# Patient Record
Sex: FEMALE | Race: BLACK OR AFRICAN AMERICAN | NOT HISPANIC OR LATINO | Employment: FULL TIME | URBAN - METROPOLITAN AREA
[De-identification: names, ages, dates, MRNs, and addresses within clinical notes are randomized per-mention and may not be internally consistent; named-entity substitution may affect disease eponyms.]

---

## 2023-01-26 PROBLEM — B37.31 CANDIDAL VAGINITIS: Status: ACTIVE | Noted: 2023-01-26

## 2023-01-26 PROBLEM — G43.909 MIGRAINE: Status: ACTIVE | Noted: 2023-01-26

## 2023-01-26 PROBLEM — D64.9 ANEMIA: Status: ACTIVE | Noted: 2023-01-26

## 2023-01-26 PROBLEM — E87.6 HYPOKALEMIA: Status: ACTIVE | Noted: 2023-01-26

## 2023-01-26 PROBLEM — E55.9 VITAMIN D DEFICIENCY: Status: ACTIVE | Noted: 2023-01-26

## 2023-01-26 PROBLEM — M54.2 NECK PAIN: Status: ACTIVE | Noted: 2023-01-26

## 2023-01-26 PROBLEM — K91.2 POSTOPERATIVE MALABSORPTION (HHS-HCC): Status: ACTIVE | Noted: 2023-01-26

## 2023-01-26 PROBLEM — M25.561 RIGHT KNEE PAIN: Status: ACTIVE | Noted: 2023-01-26

## 2023-01-26 PROBLEM — I10 HTN (HYPERTENSION): Status: ACTIVE | Noted: 2023-01-26

## 2023-01-26 PROBLEM — R63.2 POLYPHAGIA: Status: ACTIVE | Noted: 2023-01-26

## 2023-01-26 PROBLEM — G47.33 OBSTRUCTIVE SLEEP APNEA, ADULT: Status: ACTIVE | Noted: 2023-01-26

## 2023-01-26 PROBLEM — L29.9 ITCHING: Status: ACTIVE | Noted: 2023-01-26

## 2023-01-26 PROBLEM — S29.019A STRAIN OF THORACIC REGION: Status: ACTIVE | Noted: 2023-01-26

## 2023-01-26 PROBLEM — R20.0 LEFT SIDED NUMBNESS: Status: ACTIVE | Noted: 2023-01-26

## 2023-01-26 PROBLEM — Z98.84 BARIATRIC SURGERY STATUS: Status: ACTIVE | Noted: 2023-01-26

## 2023-01-26 RX ORDER — CHOLECALCIFEROL (VITAMIN D3) 125 MCG
1 CAPSULE ORAL DAILY
COMMUNITY
End: 2023-03-10 | Stop reason: SDUPTHER

## 2023-01-26 RX ORDER — PROPRANOLOL HYDROCHLORIDE 40 MG/1
1 TABLET ORAL 2 TIMES DAILY
COMMUNITY
Start: 2017-03-22 | End: 2023-03-10 | Stop reason: SDUPTHER

## 2023-01-26 RX ORDER — FOLIC ACID/MULTIVIT,IRON,MINER 0.4MG-18MG
1 TABLET ORAL DAILY
COMMUNITY
End: 2023-02-03

## 2023-01-26 RX ORDER — AMLODIPINE BESYLATE 10 MG/1
1 TABLET ORAL DAILY
COMMUNITY
Start: 2017-01-23 | End: 2023-03-10 | Stop reason: SDUPTHER

## 2023-01-26 RX ORDER — ONDANSETRON 4 MG/1
1-2 TABLET, ORALLY DISINTEGRATING ORAL
COMMUNITY
Start: 2022-09-08 | End: 2023-06-28 | Stop reason: ALTCHOICE

## 2023-01-26 RX ORDER — POTASSIUM CHLORIDE 750 MG/1
1 TABLET, EXTENDED RELEASE ORAL DAILY
COMMUNITY
Start: 2017-02-04 | End: 2023-03-10 | Stop reason: SDUPTHER

## 2023-01-26 RX ORDER — OMEPRAZOLE 40 MG/1
1 CAPSULE, DELAYED RELEASE ORAL DAILY
COMMUNITY
Start: 2022-09-08 | End: 2023-06-28 | Stop reason: ALTCHOICE

## 2023-01-26 RX ORDER — LISINOPRIL AND HYDROCHLOROTHIAZIDE 10; 12.5 MG/1; MG/1
1 TABLET ORAL DAILY
COMMUNITY
Start: 2022-10-05 | End: 2023-03-10 | Stop reason: SDUPTHER

## 2023-02-03 RX ORDER — FERROUS SULFATE 15 MG/ML
4 DROPS ORAL DAILY
COMMUNITY
End: 2023-06-28 | Stop reason: ALTCHOICE

## 2023-03-10 ENCOUNTER — OFFICE VISIT (OUTPATIENT)
Dept: PRIMARY CARE | Facility: CLINIC | Age: 38
End: 2023-03-10
Payer: COMMERCIAL

## 2023-03-10 VITALS
DIASTOLIC BLOOD PRESSURE: 80 MMHG | TEMPERATURE: 95 F | WEIGHT: 217 LBS | SYSTOLIC BLOOD PRESSURE: 110 MMHG | BODY MASS INDEX: 32.99 KG/M2

## 2023-03-10 DIAGNOSIS — I10 HYPERTENSION, UNSPECIFIED TYPE: Primary | ICD-10-CM

## 2023-03-10 PROCEDURE — 3079F DIAST BP 80-89 MM HG: CPT | Performed by: INTERNAL MEDICINE

## 2023-03-10 PROCEDURE — 3008F BODY MASS INDEX DOCD: CPT | Performed by: INTERNAL MEDICINE

## 2023-03-10 PROCEDURE — 99213 OFFICE O/P EST LOW 20 MIN: CPT | Performed by: INTERNAL MEDICINE

## 2023-03-10 PROCEDURE — 3074F SYST BP LT 130 MM HG: CPT | Performed by: INTERNAL MEDICINE

## 2023-03-10 RX ORDER — PROPRANOLOL HYDROCHLORIDE 40 MG/1
40 TABLET ORAL 2 TIMES DAILY
Qty: 60 TABLET | Refills: 3 | Status: SHIPPED | OUTPATIENT
Start: 2023-03-10 | End: 2023-06-28 | Stop reason: SDUPTHER

## 2023-03-10 RX ORDER — POTASSIUM CHLORIDE 750 MG/1
10 TABLET, FILM COATED, EXTENDED RELEASE ORAL DAILY
Qty: 30 TABLET | Refills: 3 | Status: SHIPPED | OUTPATIENT
Start: 2023-03-10 | End: 2023-03-21 | Stop reason: ALTCHOICE

## 2023-03-10 RX ORDER — CHOLECALCIFEROL (VITAMIN D3) 125 MCG
125 CAPSULE ORAL DAILY
Qty: 30 CAPSULE | Refills: 3 | Status: SHIPPED | OUTPATIENT
Start: 2023-03-10 | End: 2023-04-09

## 2023-03-10 RX ORDER — IBUPROFEN 200 MG
1 CAPSULE ORAL 3 TIMES DAILY
COMMUNITY

## 2023-03-10 RX ORDER — LISINOPRIL AND HYDROCHLOROTHIAZIDE 10; 12.5 MG/1; MG/1
1 TABLET ORAL DAILY
Qty: 30 TABLET | Refills: 3 | Status: SHIPPED | OUTPATIENT
Start: 2023-03-10 | End: 2023-06-28 | Stop reason: ALTCHOICE

## 2023-03-10 RX ORDER — AMLODIPINE BESYLATE 10 MG/1
10 TABLET ORAL DAILY
Qty: 30 TABLET | Refills: 3 | Status: SHIPPED | OUTPATIENT
Start: 2023-03-10 | End: 2023-06-28 | Stop reason: SDUPTHER

## 2023-03-10 ASSESSMENT — PATIENT HEALTH QUESTIONNAIRE - PHQ9
SUM OF ALL RESPONSES TO PHQ9 QUESTIONS 1 AND 2: 0
1. LITTLE INTEREST OR PLEASURE IN DOING THINGS: NOT AT ALL
2. FEELING DOWN, DEPRESSED OR HOPELESS: NOT AT ALL

## 2023-03-10 NOTE — PROGRESS NOTES
I reviewed with the resident the medical history and the resident’s findings on physical examination.  I discussed with the resident the patient’s diagnosis and concur with the treatment plan as documented in the resident note.     I saw and evaluated the patient. I personally obtained the key and critical portions of the history and physical exam or was physically present for key and critical portions performed by the trainee. I reviewed the trainee's documentation and discussed the patient with the trainee. I agree with the trainee's medical decision making, as documented on the trainee's note.       Jerilyn Pal MD

## 2023-03-10 NOTE — PROGRESS NOTES
MRN: [id number]     Subjective   Patient ID: Cindy Preciado is a 38 y.o. female who presents for Anemia, Hypertension, Obesity, and Sleep Apnea.  HPI  Ms Cindy Preciado is a 37 yo female patient with PMHx of HTN, morbid obesity s/p bariatric surgery, migraine, MARIA LUISA and vitamin D deficiency who presented to the office for follow up.   She has been doing well, eating regular diet and tolerating it well, has normal bowel movements, taking all her supplement and following with general surgery, she lost almost 50 lbs since the surgery. Her BP is stable around 120/80 at home, not feeling any dizziness, taking all her medications on time. She saw her OBGYN last month who ordered for her the Mammogram and she is planning to call and get it scheduled.    Review of Systems    - CONSTITUTIONAL: Denies fever and chills, loosing weight as expected after her surgery  - HEENT: Denies changes in vision and hearing.  - RESPIRATORY: Denies SOB and cough.  - CV: Denies palpitations and CP.   - GI: Denies abdominal pain, nausea, vomiting and diarrhea.  - : Denies dysuria and urinary frequency.  - MSK: Denies myalgia and  joint pain.  - SKIN: Denies rash and pruritus.  - NEUROLOGICAL: Denies headache and syncope.  - PSYCHIATRIC: Denies recent changes in mood. Denies anxiety and depression.     Objective   Physical Exam    Constitutional: patient is alert and cooperative with exam  skin: dry and warm  Eyes: EOMI and clear sclera  ENMT: moist mucous membranes  Head/Neck: normal neck, no JVD and trachea midline  Respiratory/Thorax: Clear to auscultation bilaterally, no wheezing or crackles appreciated  Cardiovascular: regular rate and rhythm, S1 and S2 present, no murmurs heard  Gastrointestinal: bowel sounds present, no pain or tenderness upon palpation, soft and nondistended  Extremities: +2 radial, posterior tibial, and pedal pulse, no lower extremity edema noted  Neurological: A&Ox3 no focal deficit     Visit Vitals  /80   Temp  35 °C (95 °F)          Assessment/Plan       #HTN  - BP in office 120/80  - Asked patient to keep a BP diary   - Will continue same regimen: Amlodipine 10 mg OD + Lisinopril-HCTZ 10-12.5 OD + propranolol 40 mg BID    #Obesity s/p bariatric surgery  - Lost 22 lbs since last visit in November (more than 50 lbs since surgery)  - On regular diet   - Having normal BM  - Continue Multivitamin OD    #MARIA LUISA  - Patient continues to wear her CPAP  - Advised patient to follow up with Pulmonology in the next 3 months to see if she would benefit from repeating her sleep study after loosing weight     #Vitamin D Deficiency      - Vit D 48 in 12/2022  - Continue Vitamin D 5000 OD  - Will consider deecreasing the dose at next visit    #Anemia/ Improving   - Hb 12 on CBC in 12/2022  - Continue Iron liquid 325 mg daily    Health maintenance  - Up to date on blood work  - up to date on PAP screening,  follows regularly with OBGYN  - Mammogram ordered by OBGYN planning to call for scheduling  - Received Flu shot this fall and believe that she received dTap during the last 10 years    Dispo: RTC in 3 months

## 2023-03-20 LAB
ALANINE AMINOTRANSFERASE (SGPT) (U/L) IN SER/PLAS: 14 U/L (ref 7–45)
ALBUMIN (G/DL) IN SER/PLAS: 4.1 G/DL (ref 3.4–5)
ALKALINE PHOSPHATASE (U/L) IN SER/PLAS: 48 U/L (ref 33–110)
ANION GAP IN SER/PLAS: 10 MMOL/L (ref 10–20)
ASPARTATE AMINOTRANSFERASE (SGOT) (U/L) IN SER/PLAS: 23 U/L (ref 9–39)
BASOPHILS (10*3/UL) IN BLOOD BY AUTOMATED COUNT: 0.04 X10E9/L (ref 0–0.1)
BASOPHILS/100 LEUKOCYTES IN BLOOD BY AUTOMATED COUNT: 0.7 % (ref 0–2)
BILIRUBIN TOTAL (MG/DL) IN SER/PLAS: 0.7 MG/DL (ref 0–1.2)
CALCIDIOL (25 OH VITAMIN D3) (NG/ML) IN SER/PLAS: 39 NG/ML
CALCIUM (MG/DL) IN SER/PLAS: 9.2 MG/DL (ref 8.6–10.3)
CARBON DIOXIDE, TOTAL (MMOL/L) IN SER/PLAS: 31 MMOL/L (ref 21–32)
CHLORIDE (MMOL/L) IN SER/PLAS: 100 MMOL/L (ref 98–107)
COBALAMIN (VITAMIN B12) (PG/ML) IN SER/PLAS: 936 PG/ML (ref 211–911)
CREATININE (MG/DL) IN SER/PLAS: 0.56 MG/DL (ref 0.5–1.05)
EOSINOPHILS (10*3/UL) IN BLOOD BY AUTOMATED COUNT: 0.05 X10E9/L (ref 0–0.7)
EOSINOPHILS/100 LEUKOCYTES IN BLOOD BY AUTOMATED COUNT: 0.9 % (ref 0–6)
ERYTHROCYTE DISTRIBUTION WIDTH (RATIO) BY AUTOMATED COUNT: 12.5 % (ref 11.5–14.5)
ERYTHROCYTE MEAN CORPUSCULAR HEMOGLOBIN CONCENTRATION (G/DL) BY AUTOMATED: 31.1 G/DL (ref 32–36)
ERYTHROCYTE MEAN CORPUSCULAR VOLUME (FL) BY AUTOMATED COUNT: 97 FL (ref 80–100)
ERYTHROCYTES (10*6/UL) IN BLOOD BY AUTOMATED COUNT: 4.09 X10E12/L (ref 4–5.2)
ESTIMATED AVERAGE GLUCOSE FOR HBA1C: 77 MG/DL
FERRITIN (UG/LL) IN SER/PLAS: 125 UG/L (ref 8–150)
FOLATE (NG/ML) IN SER/PLAS: 18.7 NG/ML
GFR FEMALE: >90 ML/MIN/1.73M2
GLUCOSE (MG/DL) IN SER/PLAS: 75 MG/DL (ref 74–99)
HEMATOCRIT (%) IN BLOOD BY AUTOMATED COUNT: 39.6 % (ref 36–46)
HEMOGLOBIN (G/DL) IN BLOOD: 12.3 G/DL (ref 12–16)
HEMOGLOBIN A1C/HEMOGLOBIN TOTAL IN BLOOD: 4.3 %
IMMATURE GRANULOCYTES/100 LEUKOCYTES IN BLOOD BY AUTOMATED COUNT: 0.4 % (ref 0–0.9)
IRON (UG/DL) IN SER/PLAS: 96 UG/DL (ref 35–150)
IRON BINDING CAPACITY (UG/DL) IN SER/PLAS: 310 UG/DL (ref 240–445)
IRON SATURATION (%) IN SER/PLAS: 31 % (ref 25–45)
LEUKOCYTES (10*3/UL) IN BLOOD BY AUTOMATED COUNT: 5.5 X10E9/L (ref 4.4–11.3)
LYMPHOCYTES (10*3/UL) IN BLOOD BY AUTOMATED COUNT: 1.88 X10E9/L (ref 1.2–4.8)
LYMPHOCYTES/100 LEUKOCYTES IN BLOOD BY AUTOMATED COUNT: 34.2 % (ref 13–44)
MONOCYTES (10*3/UL) IN BLOOD BY AUTOMATED COUNT: 0.28 X10E9/L (ref 0.1–1)
MONOCYTES/100 LEUKOCYTES IN BLOOD BY AUTOMATED COUNT: 5.1 % (ref 2–10)
NEUTROPHILS (10*3/UL) IN BLOOD BY AUTOMATED COUNT: 3.23 X10E9/L (ref 1.2–7.7)
NEUTROPHILS/100 LEUKOCYTES IN BLOOD BY AUTOMATED COUNT: 58.7 % (ref 40–80)
PARATHYRIN INTACT (PG/ML) IN SER/PLAS: 32 PG/ML (ref 18.5–88)
PLATELETS (10*3/UL) IN BLOOD AUTOMATED COUNT: 324 X10E9/L (ref 150–450)
POTASSIUM (MMOL/L) IN SER/PLAS: 2.9 MMOL/L (ref 3.5–5.3)
PROTEIN TOTAL: 7.5 G/DL (ref 6.4–8.2)
SODIUM (MMOL/L) IN SER/PLAS: 138 MMOL/L (ref 136–145)
UREA NITROGEN (MG/DL) IN SER/PLAS: 13 MG/DL (ref 6–23)

## 2023-03-21 ENCOUNTER — TELEMEDICINE (OUTPATIENT)
Dept: PRIMARY CARE | Facility: CLINIC | Age: 38
End: 2023-03-21
Payer: COMMERCIAL

## 2023-03-21 DIAGNOSIS — E87.6 HYPOKALEMIA: Primary | ICD-10-CM

## 2023-03-21 DIAGNOSIS — I10 HYPERTENSION, UNSPECIFIED TYPE: ICD-10-CM

## 2023-03-21 PROCEDURE — 99214 OFFICE O/P EST MOD 30 MIN: CPT | Performed by: INTERNAL MEDICINE

## 2023-03-21 RX ORDER — POTASSIUM CHLORIDE 1500 MG/1
40 TABLET, EXTENDED RELEASE ORAL DAILY
Qty: 60 TABLET | Refills: 0 | Status: SHIPPED | OUTPATIENT
Start: 2023-03-21 | End: 2023-05-04 | Stop reason: DRUGHIGH

## 2023-03-21 RX ORDER — LISINOPRIL 10 MG/1
10 TABLET ORAL DAILY
Qty: 30 TABLET | Refills: 2 | Status: SHIPPED | OUTPATIENT
Start: 2023-03-21 | End: 2023-06-28 | Stop reason: SDUPTHER

## 2023-03-21 NOTE — PROGRESS NOTES
Subjective   Patient ID: Cindy Preciado is a 38 y.o. female who presents for No chief complaint on file..    HPI     Review of Systems    Objective   There were no vitals taken for this visit.    Physical Exam    Assessment/Plan   Problem List Items Addressed This Visit          Circulatory    HTN (hypertension)    Relevant Medications    lisinopril 10 mg tablet       Other    Hypokalemia - Primary    Relevant Medications    potassium chloride CR (K-Tab) 20 mEq ER tablet    Other Relevant Orders    Basic metabolic panel   An interactive audio and video telecommunication system which permits real time communications between the patient (at the originating site) and provider (at the distant site) was utilized to provide this telehealth service.    Verbal consent was requested and obtained from the patient on the day of encounter.  Had BW done, which was ordered by the surgeon.  It came back today and she was told to call us.  Her K level is at 2.9.  She is already taking K 10 meq daily.  She was told she may need K infusion.  We will start her on 40 meq daily for 3 days.  We will stop Lisinopril/hydrochlorothiazide and continue with Lisinopril only.  Recheck BMP in 3 days

## 2023-03-24 ENCOUNTER — LAB (OUTPATIENT)
Dept: LAB | Facility: LAB | Age: 38
End: 2023-03-24
Payer: COMMERCIAL

## 2023-03-24 DIAGNOSIS — E87.6 HYPOKALEMIA: ICD-10-CM

## 2023-03-24 LAB
ANION GAP IN SER/PLAS: 8 MMOL/L (ref 10–20)
CALCIUM (MG/DL) IN SER/PLAS: 8.8 MG/DL (ref 8.6–10.3)
CARBON DIOXIDE, TOTAL (MMOL/L) IN SER/PLAS: 30 MMOL/L (ref 21–32)
CHLORIDE (MMOL/L) IN SER/PLAS: 104 MMOL/L (ref 98–107)
CREATININE (MG/DL) IN SER/PLAS: 0.62 MG/DL (ref 0.5–1.05)
GFR FEMALE: >90 ML/MIN/1.73M2
GLUCOSE (MG/DL) IN SER/PLAS: 84 MG/DL (ref 74–99)
POTASSIUM (MMOL/L) IN SER/PLAS: 3.9 MMOL/L (ref 3.5–5.3)
SODIUM (MMOL/L) IN SER/PLAS: 138 MMOL/L (ref 136–145)
UREA NITROGEN (MG/DL) IN SER/PLAS: 14 MG/DL (ref 6–23)

## 2023-03-24 PROCEDURE — 36415 COLL VENOUS BLD VENIPUNCTURE: CPT

## 2023-03-24 PROCEDURE — 80048 BASIC METABOLIC PNL TOTAL CA: CPT

## 2023-03-27 LAB — VITAMIN B1, WHOLE BLOOD: 125 NMOL/L (ref 70–180)

## 2023-04-26 DIAGNOSIS — E87.6 HYPOKALEMIA: ICD-10-CM

## 2023-04-26 RX ORDER — POTASSIUM CHLORIDE 750 MG/1
10 TABLET, FILM COATED, EXTENDED RELEASE ORAL DAILY
Qty: 30 TABLET | Refills: 2 | Status: SHIPPED | OUTPATIENT
Start: 2023-04-26 | End: 2023-05-04 | Stop reason: DRUGHIGH

## 2023-05-04 DIAGNOSIS — E87.6 HYPOKALEMIA: ICD-10-CM

## 2023-05-04 RX ORDER — POTASSIUM CHLORIDE 750 MG/1
10 TABLET, FILM COATED, EXTENDED RELEASE ORAL DAILY
Qty: 90 TABLET | Refills: 0 | Status: SHIPPED | OUTPATIENT
Start: 2023-05-04 | End: 2023-05-09 | Stop reason: SDUPTHER

## 2023-05-09 DIAGNOSIS — E87.6 HYPOKALEMIA: ICD-10-CM

## 2023-05-09 RX ORDER — POTASSIUM CHLORIDE 750 MG/1
10 TABLET, FILM COATED, EXTENDED RELEASE ORAL DAILY
Qty: 90 TABLET | Refills: 0 | Status: SHIPPED | OUTPATIENT
Start: 2023-05-09 | End: 2023-06-28 | Stop reason: SDUPTHER

## 2023-06-28 ENCOUNTER — OFFICE VISIT (OUTPATIENT)
Dept: PRIMARY CARE | Facility: CLINIC | Age: 38
End: 2023-06-28
Payer: COMMERCIAL

## 2023-06-28 ENCOUNTER — LAB (OUTPATIENT)
Dept: LAB | Facility: LAB | Age: 38
End: 2023-06-28
Payer: COMMERCIAL

## 2023-06-28 VITALS
WEIGHT: 204 LBS | SYSTOLIC BLOOD PRESSURE: 122 MMHG | DIASTOLIC BLOOD PRESSURE: 80 MMHG | HEIGHT: 68 IN | BODY MASS INDEX: 30.92 KG/M2

## 2023-06-28 DIAGNOSIS — E87.6 HYPOKALEMIA: ICD-10-CM

## 2023-06-28 DIAGNOSIS — I10 HYPERTENSION, UNSPECIFIED TYPE: ICD-10-CM

## 2023-06-28 DIAGNOSIS — G43.909 MIGRAINE WITHOUT STATUS MIGRAINOSUS, NOT INTRACTABLE, UNSPECIFIED MIGRAINE TYPE: Primary | ICD-10-CM

## 2023-06-28 LAB
ANION GAP IN SER/PLAS: 12 MMOL/L (ref 10–20)
CALCIUM (MG/DL) IN SER/PLAS: 9.8 MG/DL (ref 8.6–10.6)
CARBON DIOXIDE, TOTAL (MMOL/L) IN SER/PLAS: 26 MMOL/L (ref 21–32)
CHLORIDE (MMOL/L) IN SER/PLAS: 106 MMOL/L (ref 98–107)
CREATININE (MG/DL) IN SER/PLAS: 0.75 MG/DL (ref 0.5–1.05)
GFR FEMALE: >90 ML/MIN/1.73M2
GLUCOSE (MG/DL) IN SER/PLAS: 85 MG/DL (ref 74–99)
POTASSIUM (MMOL/L) IN SER/PLAS: 4.5 MMOL/L (ref 3.5–5.3)
SODIUM (MMOL/L) IN SER/PLAS: 139 MMOL/L (ref 136–145)
UREA NITROGEN (MG/DL) IN SER/PLAS: 18 MG/DL (ref 6–23)

## 2023-06-28 PROCEDURE — 80048 BASIC METABOLIC PNL TOTAL CA: CPT

## 2023-06-28 PROCEDURE — 99214 OFFICE O/P EST MOD 30 MIN: CPT | Performed by: INTERNAL MEDICINE

## 2023-06-28 PROCEDURE — 3008F BODY MASS INDEX DOCD: CPT | Performed by: INTERNAL MEDICINE

## 2023-06-28 PROCEDURE — 36415 COLL VENOUS BLD VENIPUNCTURE: CPT

## 2023-06-28 PROCEDURE — 3079F DIAST BP 80-89 MM HG: CPT | Performed by: INTERNAL MEDICINE

## 2023-06-28 PROCEDURE — 3074F SYST BP LT 130 MM HG: CPT | Performed by: INTERNAL MEDICINE

## 2023-06-28 RX ORDER — LISINOPRIL 10 MG/1
10 TABLET ORAL DAILY
Qty: 90 TABLET | Refills: 1 | Status: SHIPPED | OUTPATIENT
Start: 2023-06-28 | End: 2024-03-04 | Stop reason: SDUPTHER

## 2023-06-28 RX ORDER — POTASSIUM CHLORIDE 750 MG/1
10 TABLET, FILM COATED, EXTENDED RELEASE ORAL DAILY
Qty: 90 TABLET | Refills: 0 | Status: SHIPPED | OUTPATIENT
Start: 2023-06-28

## 2023-06-28 RX ORDER — PROPRANOLOL HYDROCHLORIDE 40 MG/1
40 TABLET ORAL 2 TIMES DAILY
Qty: 180 TABLET | Refills: 1 | Status: SHIPPED | OUTPATIENT
Start: 2023-06-28 | End: 2024-03-04 | Stop reason: SDUPTHER

## 2023-06-28 RX ORDER — AMLODIPINE BESYLATE 10 MG/1
10 TABLET ORAL DAILY
Qty: 90 TABLET | Refills: 1 | Status: SHIPPED | OUTPATIENT
Start: 2023-06-28 | End: 2024-03-12 | Stop reason: SDUPTHER

## 2023-06-28 ASSESSMENT — ENCOUNTER SYMPTOMS
RESPIRATORY NEGATIVE: 1
CARDIOVASCULAR NEGATIVE: 1
CONSTITUTIONAL NEGATIVE: 1
GASTROINTESTINAL NEGATIVE: 1

## 2023-06-28 NOTE — PROGRESS NOTES
Subjective   Patient ID: Cindy Preciado is a 38 y.o. female who presents for Follow-up.  HPI    Review of Systems   Constitutional: Negative.    Respiratory: Negative.     Cardiovascular: Negative.    Gastrointestinal: Negative.        Objective   Physical Exam  Constitutional:       Appearance: She is well-developed.   Cardiovascular:      Rate and Rhythm: Normal rate and regular rhythm.      Heart sounds: Normal heart sounds. No murmur heard.  Pulmonary:      Effort: Pulmonary effort is normal.      Breath sounds: Normal breath sounds.   Abdominal:      General: Bowel sounds are normal.      Palpations: Abdomen is soft.         Assessment/Plan   Problem List Items Addressed This Visit       HTN (hypertension)    Relevant Medications    amLODIPine (Norvasc) 10 mg tablet    lisinopril 10 mg tablet    propranolol (Inderal) 40 mg tablet    Hypokalemia    Relevant Medications    potassium chloride CR (Klor-Con) 10 mEq ER tablet    Other Relevant Orders    Basic metabolic panel    Migraine - Primary        HTN.  Well controlled.  Continue with current medications.  Check BP at home daily.  Report to us if the numbers are higher than 130/80.    Hypokalemia  Lisinopril stopped 3 months ago  Rechecking K now  May not need K supplements anymore    Migraines  Well controlled with Propranolol    Due for a physical       Jerilyn Pal MD

## 2023-06-29 ENCOUNTER — TELEPHONE (OUTPATIENT)
Dept: PRIMARY CARE | Facility: CLINIC | Age: 38
End: 2023-06-29
Payer: COMMERCIAL

## 2023-08-25 ENCOUNTER — OFFICE VISIT (OUTPATIENT)
Dept: PRIMARY CARE | Facility: CLINIC | Age: 38
End: 2023-08-25
Payer: COMMERCIAL

## 2023-08-25 VITALS — WEIGHT: 192 LBS | DIASTOLIC BLOOD PRESSURE: 82 MMHG | SYSTOLIC BLOOD PRESSURE: 120 MMHG | BODY MASS INDEX: 29.19 KG/M2

## 2023-08-25 DIAGNOSIS — Z00.00 PHYSICAL EXAM, ANNUAL: Primary | ICD-10-CM

## 2023-08-25 DIAGNOSIS — I10 HYPERTENSION, UNSPECIFIED TYPE: ICD-10-CM

## 2023-08-25 PROCEDURE — 3074F SYST BP LT 130 MM HG: CPT | Performed by: INTERNAL MEDICINE

## 2023-08-25 PROCEDURE — 99396 PREV VISIT EST AGE 40-64: CPT | Performed by: INTERNAL MEDICINE

## 2023-08-25 PROCEDURE — 3079F DIAST BP 80-89 MM HG: CPT | Performed by: INTERNAL MEDICINE

## 2023-08-25 PROCEDURE — 3008F BODY MASS INDEX DOCD: CPT | Performed by: INTERNAL MEDICINE

## 2023-08-25 ASSESSMENT — PATIENT HEALTH QUESTIONNAIRE - PHQ9
SUM OF ALL RESPONSES TO PHQ9 QUESTIONS 1 AND 2: 0
2. FEELING DOWN, DEPRESSED OR HOPELESS: NOT AT ALL
1. LITTLE INTEREST OR PLEASURE IN DOING THINGS: NOT AT ALL

## 2023-08-25 NOTE — PROGRESS NOTES
Subjective   Patient ID: Cidny Preciado is a 38 y.o. female who presents for Annual Exam.  Patient here for CPE. Doing well overall. Has lost over 100lbs since bariatric surgery. Denies chest pain, LOPEZ, abd pain, n/v/d/c, changes in urination.   Mood is stable and sleep is regular.   Complaint with meds at home.         Review of Systems   All other systems reviewed and are negative.      Objective   Physical Exam  Vitals reviewed.   Constitutional:       Appearance: Normal appearance.   Cardiovascular:      Rate and Rhythm: Normal rate and regular rhythm.      Pulses: Normal pulses.      Heart sounds: Normal heart sounds.   Pulmonary:      Effort: Pulmonary effort is normal.      Breath sounds: Normal breath sounds.   Abdominal:      General: Abdomen is flat. Bowel sounds are normal.      Palpations: Abdomen is soft.   Neurological:      Mental Status: She is alert.       /82   Wt 87.1 kg (192 lb)   BMI 29.19 kg/m²      Assessment/Plan   Problem List Items Addressed This Visit       HTN (hypertension),well controlled   -labs UTD   -BP stable  -continue current meds      Other Visit Diagnoses       Physical exam, annual    -  Primary  -labs UTD   -immunizations UTD, will get flu shot at later time   -mammo UTD. Will make appt with OB for pap

## 2023-08-25 NOTE — PROGRESS NOTES
I reviewed with the resident the medical history and the resident’s findings on physical examination.  I discussed with the resident the patient’s diagnosis and concur with the treatment plan as documented in the resident note.     I saw and evaluated the patient. I personally obtained the key and critical portions of the history and physical exam or was physically present for key and critical portions performed by the trainee. I reviewed the trainee's documentation and discussed the patient with the trainee. I agree with the trainee's medical decision making, as documented on the trainee's note.     Normal exam  BW good  Continue with current      Jerilyn Pal MD

## 2023-09-30 ENCOUNTER — LAB (OUTPATIENT)
Dept: LAB | Facility: LAB | Age: 38
End: 2023-09-30
Payer: COMMERCIAL

## 2023-09-30 DIAGNOSIS — Z98.84 BARIATRIC SURGERY STATUS: ICD-10-CM

## 2023-09-30 DIAGNOSIS — K91.2 POSTSURGICAL MALABSORPTION, NOT ELSEWHERE CLASSIFIED (HHS-HCC): Primary | ICD-10-CM

## 2023-09-30 LAB
ALBUMIN SERPL BCP-MCNC: 4.1 G/DL (ref 3.4–5)
ALP SERPL-CCNC: 45 U/L (ref 33–110)
ALT SERPL W P-5'-P-CCNC: 10 U/L (ref 7–45)
ANION GAP SERPL CALC-SCNC: 11 MMOL/L (ref 10–20)
AST SERPL W P-5'-P-CCNC: 14 U/L (ref 9–39)
BASOPHILS # BLD AUTO: 0.03 X10*3/UL (ref 0–0.1)
BASOPHILS NFR BLD AUTO: 0.6 %
BILIRUB SERPL-MCNC: 0.7 MG/DL (ref 0–1.2)
BUN SERPL-MCNC: 11 MG/DL (ref 6–23)
CALCIUM SERPL-MCNC: 9.1 MG/DL (ref 8.6–10.3)
CHLORIDE SERPL-SCNC: 105 MMOL/L (ref 98–107)
CHOLEST SERPL-MCNC: 209 MG/DL (ref 0–199)
CHOLESTEROL/HDL RATIO: 3.4
CO2 SERPL-SCNC: 27 MMOL/L (ref 21–32)
CREAT SERPL-MCNC: 0.58 MG/DL (ref 0.5–1.05)
EOSINOPHIL # BLD AUTO: 0.09 X10*3/UL (ref 0–0.7)
EOSINOPHIL NFR BLD AUTO: 1.8 %
FERRITIN SERPL-MCNC: 135 NG/ML (ref 8–150)
GFR SERPL CREATININE-BSD FRML MDRD: >90 ML/MIN/1.73M*2
GLUCOSE SERPL-MCNC: 86 MG/DL (ref 74–99)
HDLC SERPL-MCNC: 62.3 MG/DL
IMM GRANULOCYTES # BLD AUTO: 0.01 X10*3/UL (ref 0–0.7)
IMM GRANULOCYTES NFR BLD AUTO: 0.2 % (ref 0–0.9)
IRON SATN MFR SERPL: 27 % (ref 25–45)
IRON SERPL-MCNC: 81 UG/DL (ref 35–150)
LDLC SERPL CALC-MCNC: 138 MG/DL (ref 140–190)
LYMPHOCYTES # BLD AUTO: 1.94 X10*3/UL (ref 1.2–4.8)
LYMPHOCYTES NFR BLD AUTO: 38.3 %
MONOCYTES # BLD AUTO: 0.33 X10*3/UL (ref 0.1–1)
MONOCYTES NFR BLD AUTO: 6.5 %
NEUTROPHILS # BLD AUTO: 2.66 X10*3/UL (ref 1.2–7.7)
NEUTROPHILS NFR BLD AUTO: 52.6 %
NON HDL CHOLESTEROL: 147 MG/DL (ref 0–149)
POTASSIUM SERPL-SCNC: 3.7 MMOL/L (ref 3.5–5.3)
PROT SERPL-MCNC: 6.8 G/DL (ref 6.4–8.2)
SODIUM SERPL-SCNC: 139 MMOL/L (ref 136–145)
TIBC SERPL-MCNC: 299 UG/DL (ref 240–445)
TRIGL SERPL-MCNC: 46 MG/DL (ref 0–149)
TSH SERPL-ACNC: 1.26 MIU/L (ref 0.44–3.98)
UIBC SERPL-MCNC: 218 UG/DL (ref 110–370)
VLDL: 9 MG/DL (ref 0–40)

## 2023-09-30 PROCEDURE — 83540 ASSAY OF IRON: CPT

## 2023-09-30 PROCEDURE — 84443 ASSAY THYROID STIM HORMONE: CPT

## 2023-09-30 PROCEDURE — 36415 COLL VENOUS BLD VENIPUNCTURE: CPT

## 2023-09-30 PROCEDURE — 83550 IRON BINDING TEST: CPT

## 2023-09-30 PROCEDURE — 80061 LIPID PANEL: CPT

## 2023-09-30 PROCEDURE — 80053 COMPREHEN METABOLIC PANEL: CPT

## 2023-10-01 LAB
25(OH)D3 SERPL-MCNC: 27 NG/ML (ref 30–100)
FOLATE SERPL-MCNC: 19 NG/ML
VIT B12 SERPL-MCNC: 973 PG/ML (ref 211–911)

## 2023-11-07 ENCOUNTER — TELEMEDICINE CLINICAL SUPPORT (OUTPATIENT)
Dept: SURGERY | Facility: CLINIC | Age: 38
End: 2023-11-07
Payer: COMMERCIAL

## 2023-11-07 VITALS — BODY MASS INDEX: 27.92 KG/M2 | WEIGHT: 184.2 LBS | HEIGHT: 68 IN

## 2023-11-07 DIAGNOSIS — E66.3 OVERWEIGHT (BMI 25.0-29.9): Primary | ICD-10-CM

## 2023-11-07 NOTE — PROGRESS NOTES
"Follow Up Bariatric Nutrition Assessment                  Name: Cindy Preciado  MRN: [id number]  Date: 11/07/23    Surgery Date: 9-19-22  Surgeon: Munir  Procedure: sleeve gastrectomy    ASSESSMENT:    Current weight:      Vitals:    11/07/23 0811   Weight: 83.6 kg (184 lb 3.2 oz)                 Ht: 1.727 m (5' 8\")  BMI:  Body mass index is 28.01 kg/m².  Previous weight:   202.8lbs  Initial start weight:   285lbs  EBW:  121lbs  Total weight change:  101lbs  %EBW Lost: 83.4%    PROGRESS:   1. Continue to eat 60-70 g of protein per day. Pick either the protein shake or the egg/sausage breakfast to get less kcals/protein. Try low kcals snacks if needed like rice cakes and fruit.   2. Continue to drink 64 oz. of zero calorie beverages per day.   3. Continue no drinking 30 min before, during the meal and for 30 minutes after the meal  4. Continue to go to the gym. Increase intensity and duration of exercise as able.   5. Continue current vit/min regimen.         CHANGES IN TREATMENT:   Patient met goals: met    24 hour food recall:   Breakfast:  egg and 2 sausage or protein shake   Snack: cottage cheese and pineapple   Lunch: protein bar (20g)  Snack:   Dinner: chicken (3-4oz) and veggies   Snack:   Beverages: water, CL and coffee w/protein shake w/SF syrup   Alcohol: wine on occasion       Vitamins:  2 Flintstones MVI, 1500 mg Calcium, and 1000 mcg. B12 (will take 3x/wk), iron started 75mg  Medications:   Current Outpatient Medications:     amLODIPine (Norvasc) 10 mg tablet, Take 1 tablet (10 mg) by mouth once daily., Disp: 90 tablet, Rfl: 1    calcium citrate (Calcitrate) 200 mg (950 mg) tablet, Take 1 tablet (200 mg) by mouth 3 times a day., Disp: , Rfl:     lisinopril 10 mg tablet, Take 1 tablet (10 mg) by mouth once daily., Disp: 90 tablet, Rfl: 1    mv-min/iron/folic/calcium/vitK (WOMEN'S MULTIVITAMIN ORAL), Take 1 tablet by mouth 1 (one) time each day., Disp: , Rfl:     potassium chloride CR (Klor-Con) 10 mEq ER " tablet, Take 1 tablet (10 mEq) by mouth once daily. Do not crush, chew, or split., Disp: 90 tablet, Rfl: 0    propranolol (Inderal) 40 mg tablet, Take 1 tablet (40 mg) by mouth 2 times a day., Disp: 180 tablet, Rfl: 1  Physical Activity: gym 5 days (cardio and weight) a week 60 minutes     READINESS TO LEARN:  Motivation to learn:    Interested      Understanding of instruction: Good       Anticipated Compliance: Good      Family Support: Unable to assess-family not present     Patient presents with post-op weight loss surgery sleeve gastrectomy. Pt is 14 months post op.   Patient has lost 101 pounds since initial assessment accounting for 83.4% loss excess body weight.  Tolerating regular diet without difficulty. Has been practicing post op behaviors. Protein intake is adequate for post-op individual. Fluid consumption is adequate. Patient is supplementing recommended vitamin/minerals. Vitamin B12 slightly elevated and recommended pt to decrease to taking 2x per week. Vitamin D slightly low, encouraged pt to begin taking a vitamin D supplement. Pt states no concerns/difficulties.    Malnutrition Screening  Significant unintentional weight loss? n/a  Eating less than 75% of usual intake for more than 2 weeks? n/a     Nutrition Diagnosis:   Increased protein and nutrition needs related to altered GI function as evidenced by pt. s/p sleeve gastrectomy.  Food- and nutrition-related knowledge deficit related to lack of prior exposure to surgical weight loss information as evidenced by diet recall.     Nutrition Interventions:   Modify type and amount of food and nutrients within meals and snacks.  Comprehensive Nutrition Education  -Nutrition education materials:       Recommendations:    Eat 60-70 g of protein per day  Drink 64 oz. of zero calorie beverages per day  Continue no drinking 30 min before, during the meal and for 30 minutes after the meal  Increase intensity and duration of exercise. As your stamina and  strength increases, increase speed of cardio and weights when lifting.   Continue current vit/min regimen. Reduce B12 intake to 2x per week. Take an additional vitamin D supplement.      Nutrition Monitoring and Evaluation:   1-2 pounds weight loss per week  Criteria: weight check, food recall  Need for Follow-up: 4 months     Cris Antoine MS, RD, LD  Phone: [phone]

## 2024-03-04 DIAGNOSIS — I10 HYPERTENSION, UNSPECIFIED TYPE: ICD-10-CM

## 2024-03-04 RX ORDER — PROPRANOLOL HYDROCHLORIDE 40 MG/1
40 TABLET ORAL 2 TIMES DAILY
Qty: 180 TABLET | Refills: 0 | Status: SHIPPED | OUTPATIENT
Start: 2024-03-04 | End: 2024-03-12 | Stop reason: SDUPTHER

## 2024-03-04 RX ORDER — LISINOPRIL 10 MG/1
10 TABLET ORAL DAILY
Qty: 90 TABLET | Refills: 0 | Status: SHIPPED | OUTPATIENT
Start: 2024-03-04 | End: 2024-03-12 | Stop reason: SDUPTHER

## 2024-03-05 NOTE — PROGRESS NOTES
"Follow Up Bariatric Nutrition Assessment                  Name: Cindy Preciado  MRN: [id number]  Date: 03/12/24    Surgery Date: 9-19-22  Surgeon: Munir  Procedure: sleeve gastrectomy    ASSESSMENT:  Current weight:      Vitals:    03/12/24 0831   Weight: 85.7 kg (189 lb)                 Ht: 1.727 m (5' 8\")  BMI:  Body mass index is 28.74 kg/m².  Previous weight:   184lbs  Initial start weight:   285lbs  EBW:  121lbs  Total weight change:  96lbs  %EBW Lost: 79.3%    PROGRESS:    Nutrition Interventions for last encounter    Eat 60-70 g of protein per day  Drink 64 oz. of zero calorie beverages per day  Continue no drinking 30 min before, during the meal and for 30 minutes after the meal  Increase intensity and duration of exercise. As your stamina and strength increases, increase speed of cardio and weights when lifting.   Continue current vit/min regimen. Reduce B12 intake to 2x per week. Take an additional vitamin D supplement.        CHANGES IN TREATMENT:   Patient met goals: met    24 hour food recall:   Breakfast:  egg and 2 sausage or protein shake   Snack: cottage cheese and pineapple   Lunch: protein bar (20g)  Snack:   Dinner: meat (3-4oz) and veggies   Snack:   Beverages:  water, CL and coffee w/protein shake w/SF syrup   Alcohol: wine on occasion- rarely       Vitamins:  2 Flintstones MVI, 1500 mg Calcium, and 1000 mcg. B12 (will take 3x/wk), iron started 75mg   Medications:   Current Outpatient Medications:     amLODIPine (Norvasc) 10 mg tablet, Take 1 tablet (10 mg) by mouth once daily., Disp: 90 tablet, Rfl: 1    calcium citrate (Calcitrate) 200 mg (950 mg) tablet, Take 1 tablet (200 mg) by mouth 3 times a day., Disp: , Rfl:     lisinopril 10 mg tablet, Take 1 tablet (10 mg) by mouth once daily., Disp: 90 tablet, Rfl: 0    mv-min/iron/folic/calcium/vitK (WOMEN'S MULTIVITAMIN ORAL), Take 1 tablet by mouth 1 (one) time each day., Disp: , Rfl:     potassium chloride CR (Klor-Con) 10 mEq ER tablet, Take 1 " tablet (10 mEq) by mouth once daily. Do not crush, chew, or split., Disp: 90 tablet, Rfl: 0    propranolol (Inderal) 40 mg tablet, Take 1 tablet (40 mg) by mouth 2 times a day., Disp: 180 tablet, Rfl: 0  Physical Activity: gym 5 days (cardio and weight) a week 60 minutes - has been increasing weights and intensity as exercises get easier     READINESS TO LEARN:  Motivation to learn:     Interested      Understanding of instruction:  Good       Anticipated Compliance: Good     Family Support: Unable to assess-family not present     Patient presents with post-op weight loss surgery sleeve gastrectomy. Pt is 18 months post op.   Patient has lost 96 pounds since initial assessment accounting for 79.3% loss excess body weight. Noted that her weight ranges from 185-195lbs. Her goal is to maintain her weight where it is at now.  Tolerating regular diet without difficulty. Has been tracking her intake and has been getting about 150g of protein and has been getting 1400-1500kcals. Has noticed that she has had increased sweet tooth.  Protein intake is adequate for post-op individual. Fluid consumption is adequate. Patient is supplementing recommended vitamin/minerals. Pt states no concerns/difficulties. Has demonstrated that she is able to problem solve nutrition barriers on her own. Follow up in 6 months for accountability.     Malnutrition Screening  Significant unintentional weight loss? n/a  Eating less than 75% of usual intake for more than 2 weeks? n/a     Nutrition Diagnosis:   Increased protein and nutrition needs related to altered GI function as evidenced by pt. s/p sleeve gastrectomy.  Food- and nutrition-related knowledge deficit related to lack of prior exposure to surgical weight loss information as evidenced by diet recall.     Nutrition Interventions:   Modify type and amount of food and nutrients within meals and snacks.  Comprehensive Nutrition Education  -Nutrition education materials:          Recommendations:    Eat at least 80 g of protein per day for muscle building with exercise routine.   Drink at least 64 oz. of zero calorie beverages per day  Continue no drinking 30 min before, during the meal and for 30 minutes after the meal  Increase intensity and duration of exercise  Continue current vit/min regimen    Nutrition Monitoring and Evaluation:   1-2 pounds weight loss per week  Criteria: weight check, food recall  Need for Follow-up: 6 months    Cris Antoine MS, RD, LD  Phone: [phone]

## 2024-03-12 ENCOUNTER — TELEMEDICINE (OUTPATIENT)
Dept: PRIMARY CARE | Facility: CLINIC | Age: 39
End: 2024-03-12
Payer: COMMERCIAL

## 2024-03-12 ENCOUNTER — NUTRITION (OUTPATIENT)
Dept: SURGERY | Facility: CLINIC | Age: 39
End: 2024-03-12
Payer: COMMERCIAL

## 2024-03-12 VITALS — HEIGHT: 68 IN | WEIGHT: 189 LBS | BODY MASS INDEX: 28.64 KG/M2

## 2024-03-12 DIAGNOSIS — E55.9 VITAMIN D DEFICIENCY: ICD-10-CM

## 2024-03-12 DIAGNOSIS — E55.9 HYPOVITAMINOSIS D: ICD-10-CM

## 2024-03-12 DIAGNOSIS — I10 HYPERTENSION, UNSPECIFIED TYPE: Primary | ICD-10-CM

## 2024-03-12 PROCEDURE — 99214 OFFICE O/P EST MOD 30 MIN: CPT | Performed by: INTERNAL MEDICINE

## 2024-03-12 RX ORDER — AMLODIPINE BESYLATE 10 MG/1
10 TABLET ORAL DAILY
Qty: 90 TABLET | Refills: 1 | Status: SHIPPED | OUTPATIENT
Start: 2024-03-12 | End: 2024-09-08

## 2024-03-12 RX ORDER — LISINOPRIL 10 MG/1
10 TABLET ORAL DAILY
Qty: 90 TABLET | Refills: 0 | Status: SHIPPED | OUTPATIENT
Start: 2024-03-12 | End: 2024-09-08

## 2024-03-12 RX ORDER — PROPRANOLOL HYDROCHLORIDE 40 MG/1
40 TABLET ORAL 2 TIMES DAILY
Qty: 180 TABLET | Refills: 0 | Status: SHIPPED | OUTPATIENT
Start: 2024-03-12

## 2024-03-12 RX ORDER — ACETAMINOPHEN 500 MG
5000 TABLET ORAL DAILY
Qty: 30 TABLET | Refills: 3 | Status: SHIPPED | OUTPATIENT
Start: 2024-03-12

## 2024-03-12 ASSESSMENT — ENCOUNTER SYMPTOMS
CARDIOVASCULAR NEGATIVE: 1
RESPIRATORY NEGATIVE: 1
GASTROINTESTINAL NEGATIVE: 1
CONSTITUTIONAL NEGATIVE: 1

## 2024-03-12 NOTE — PROGRESS NOTES
Chief Complaint:   Chief Complaint   Patient presents with    Follow-up      HPI    Cindy Preciado is a 39 y.o. year old female who presents to the clinic for follow up and refills.    Past Medical History   Past Medical History:   Diagnosis Date    Body mass index (BMI) 36.0-36.9, adult 2022    BMI 36.0-36.9,adult    Body mass index (BMI) 37.0-37.9, adult 10/06/2022    BMI 37.0-37.9, adult    Body mass index (BMI)40.0-44.9, adult 10/06/2022    Body mass index (BMI) of 40.0 to 44.9 in adult    Hyperlipidemia, unspecified     Borderline hyperlipidemia    Morbid (severe) obesity due to excess calories (CMS/[facility]) 2022    Obesity, Class III, BMI 40-49.9 (morbid obesity)    Personal history of other specified conditions 2022    History of postoperative nausea and vomiting    Polyphagia 2020    Polyphagia      Past Surgical History:   Past Surgical History:   Procedure Laterality Date    OTHER SURGICAL HISTORY  2022    Intrauterine device removal    OTHER SURGICAL HISTORY  2022    Salpingo-oophorectomy    OTHER SURGICAL HISTORY  2022     section    OTHER SURGICAL HISTORY  2022    Hysterectomy    OTHER SURGICAL HISTORY  2022    Sleeve gastrectomy     Family History:   Family History   Problem Relation Name Age of Onset    M[place]oya disease Mother      Diabetes Paternal Grandmother      Hypertension Paternal Grandfather       Social History:   Tobacco Use: Medium Risk (2023)    Patient History     Smoking Tobacco Use: Former     Smokeless Tobacco Use: Never     Passive Exposure: Not on file      Social History     Substance and Sexual Activity   Alcohol Use Not Currently        Allergies:   No Known Allergies     ROS   Review of Systems   Constitutional: Negative.    Respiratory: Negative.     Cardiovascular: Negative.    Gastrointestinal: Negative.         Objective   There were no vitals filed for this visit.   BMI Readings from Last 15 Encounters:  "  03/12/24 28.74 kg/m²   11/07/23 28.01 kg/m²   09/22/23 28.28 kg/m²   08/25/23 29.19 kg/m²   06/28/23 31.02 kg/m²   03/20/23 32.39 kg/m²   03/10/23 32.99 kg/m²   02/07/23 34.55 kg/m²   12/08/22 35.05 kg/m²   11/18/22 36.34 kg/m²   11/03/22 36.34 kg/m²   10/06/22 37.73 kg/m²   10/03/22 38.62 kg/m²   09/08/22 41.66 kg/m²   06/23/22 43.35 kg/m²      85.7 kg (189 lb) (3/12/2024  8:31 AM)      Physical Exam  Physical Exam  Neurological:      Mental Status: She is alert.   Psychiatric:         Mood and Affect: Mood normal.          Labs:  Lab Results   Component Value Date    WBC 5.5 03/20/2023    HGB 12.3 03/20/2023    HCT 39.6 03/20/2023    MCV 97 03/20/2023     03/20/2023     Lab Results   Component Value Date    GLUCOSE 86 09/30/2023    CALCIUM 9.1 09/30/2023     09/30/2023    K 3.7 09/30/2023    CO2 27 09/30/2023     09/30/2023    BUN 11 09/30/2023    CREATININE 0.58 09/30/2023         No components found for: \"URINE ALBUMIN\"  Lab Results   Component Value Date    HGBA1C 4.3 03/20/2023      Lab Results   Component Value Date    HGBA1C 4.3 03/20/2023    HGBA1C 4.4 12/03/2022    HGBA1C 4.8 05/28/2022     Lab Results   Component Value Date    TSH 1.26 09/30/2023       Current Medications:  Current Outpatient Medications   Medication Sig Dispense Refill    amLODIPine (Norvasc) 10 mg tablet Take 1 tablet (10 mg) by mouth once daily. 90 tablet 1    calcium citrate (Calcitrate) 200 mg (950 mg) tablet Take 1 tablet (200 mg) by mouth 3 times a day.      cholecalciferol (Vitamin D3) 5,000 Units tablet Take 1 tablet (5,000 Units) by mouth once daily. 30 tablet 3    lisinopril 10 mg tablet Take 1 tablet (10 mg) by mouth once daily. 90 tablet 0    mv-min/iron/folic/calcium/vitK (WOMEN'S MULTIVITAMIN ORAL) Take 1 tablet by mouth 1 (one) time each day.      potassium chloride CR (Klor-Con) 10 mEq ER tablet Take 1 tablet (10 mEq) by mouth once daily. Do not crush, chew, or split. 90 tablet 0    propranolol " (Inderal) 40 mg tablet Take 1 tablet (40 mg) by mouth 2 times a day. 180 tablet 0     No current facility-administered medications for this visit.       Assessment and Plan  Cindy was seen today for follow-up.  Diagnoses and all orders for this visit:  Hypertension, unspecified type (Primary)  -     amLODIPine (Norvasc) 10 mg tablet; Take 1 tablet (10 mg) by mouth once daily.  -     lisinopril 10 mg tablet; Take 1 tablet (10 mg) by mouth once daily.  -     propranolol (Inderal) 40 mg tablet; Take 1 tablet (40 mg) by mouth 2 times a day.  Vitamin D deficiency  Hypovitaminosis D  -     cholecalciferol (Vitamin D3) 5,000 Units tablet; Take 1 tablet (5,000 Units) by mouth once daily.        HTN.  Well controlled.  Continue with current medications.  Check BP at home daily.  Report to us if the numbers are higher than 130/80.    HLD  No need for statins  Watch diet closely    Vitamin D def  Start 5000 units daily      Immunizations:  Immunization History   Administered Date(s) Administered    Influenza, seasonal, injectable 10/18/2022    [employer] Purple Cap SARS-CoV-2 06/26/2021, 07/17/2021, 01/22/2022     An interactive audio and video telecommunication system which permits real time communications between the patient (at the originating site) and provider (at the distant site) was utilized to provide this telehealth service.    Verbal consent was requested and obtained from the patient on the day of encounter.  This is a virtual visit using HIPAA compliant video platform. It required patient-provider interaction for the medical decision making as documented.     I have communicated my name and active licensure. The patient's identity and physical location were verified at the time of this visit.   Either the patient or their legal representative has been informed of the risks and benefits of -- and alternatives to -- treatment through a remote evaluation and consents to proceed with the evaluation remotely.

## 2024-03-18 ENCOUNTER — TELEMEDICINE (OUTPATIENT)
Dept: SURGERY | Facility: CLINIC | Age: 39
End: 2024-03-18
Payer: COMMERCIAL

## 2024-03-18 ENCOUNTER — APPOINTMENT (OUTPATIENT)
Dept: SURGERY | Facility: CLINIC | Age: 39
End: 2024-03-18
Payer: COMMERCIAL

## 2024-03-18 VITALS — BODY MASS INDEX: 28.79 KG/M2 | HEIGHT: 68 IN | WEIGHT: 190 LBS

## 2024-03-18 DIAGNOSIS — E66.3 OVERWEIGHT (BMI 25.0-29.9): ICD-10-CM

## 2024-03-18 DIAGNOSIS — R10.33 PERIUMBILICAL ABDOMINAL PAIN: Primary | ICD-10-CM

## 2024-03-18 DIAGNOSIS — Z98.84 S/P LAPAROSCOPIC SLEEVE GASTRECTOMY: ICD-10-CM

## 2024-03-18 PROCEDURE — 99214 OFFICE O/P EST MOD 30 MIN: CPT | Mod: GT,U1,ZK

## 2024-03-18 NOTE — PROGRESS NOTES
"Subjective     Patient ID: Cindy Preciado is a 39 y.o. female who presents for FUV .    HPI    IType of Surgery: lap sleeve gastrectomy, surgery Date: 9/19/2022.   Weight:.   Initial weight: 285 lbs.    Last visit weight: 189 lbs.    Current weight: 190 lbs.    The patient's weight was 274 lbs at pre-op visit.    Total weight lost: 95 lbs.   Ideal weight 143 lbs.       Target body weight 178 lbs.     39- year old female s/p laparoscopic sleeve gastrectomy performed on 9/19/2022 by Dr. Amaral presenting today for routine 18 months post op follow up visit with c/o  \"annoying pain 4/10 at it worst in belly button region\".      Patient has approximate baseline BMI of 29 with related comorbidities of HTN, Hypokalemia, Mild MARIA LUISA, Borderline hyperlipidemia, and vitamin D deficiency.      Diet: Meeting protein goals with regular diet - getting protein in through food sources, occ use of protein bar to supplement on weight training days.     Exercise - 5-6x/week, 3 days weight training, 2-3 days cardio     Supplements - Flintstones MVI x 2, Calcium Citrate 1200-1500mg, B12 every other day.      Any symptoms of reflux, nausea, vomiting, dysphagia - denies  Any symptoms of bowel irregularity, diarrhea, constipation - denies unless her fluid intake dips low.   Dumping - denies     No cp, palpitations, sob, LE edema    C/o pain that started about 1 week ago and is situated around the belly button and goes across the mid abdomen. It feels like an annoying ache which is pulling in character. She has to hold her stomach when she was coughing caused it \"feels off\". She denies doing any kind of exercise prior to it occurring nor was she straining. She uses hot pack or rubbing her abdomen to make it better. Nothing makes it worse on purpose. She still stays physically active and going to gym 5 times a week and exercises her abdominal muscles. At its worst the pain is 4/10. Pain has nothing to do with BM.  Her BM function is intact, " "regular and daily. She has not been seen by any provider for this reason and has not been assessed in person.  She also denies any muscle pain/weakness, bruising, fever, tea-colored urine, anuria.     Past Medical History:   Diagnosis Date    Body mass index (BMI) 36.0-36.9, adult 2022    BMI 36.0-36.9,adult    Body mass index (BMI) 37.0-37.9, adult 10/06/2022    BMI 37.0-37.9, adult    Body mass index (BMI)40.0-44.9, adult 10/06/2022    Body mass index (BMI) of 40.0 to 44.9 in adult    Hyperlipidemia, unspecified     Borderline hyperlipidemia    Morbid (severe) obesity due to excess calories (Multi) 2022    Obesity, Class III, BMI 40-49.9 (morbid obesity)    Personal history of other specified conditions 2022    History of postoperative nausea and vomiting    Polyphagia 2020    Polyphagia      Past Surgical History:   Procedure Laterality Date    OTHER SURGICAL HISTORY  2022    Intrauterine device removal    OTHER SURGICAL HISTORY  2022    Salpingo-oophorectomy    OTHER SURGICAL HISTORY  2022     section    OTHER SURGICAL HISTORY  2022    Hysterectomy    OTHER SURGICAL HISTORY  2022    Sleeve gastrectomy      Family History   Problem Relation Name Age of Onset    Moyamoya disease Mother      Diabetes Paternal Grandmother      Hypertension Paternal Grandfather        Social History     Tobacco Use   Smoking Status Former    Types: Cigarettes   Smokeless Tobacco Never      Social History     Substance and Sexual Activity   Drug Use Never      Social History     Substance and Sexual Activity   Alcohol Use Not Currently        Allergies  No Known Allergies     VITALS  Visit Vitals  Ht 1.727 m (5' 8\")   Wt 86.2 kg (190 lb)   BMI 28.89 kg/m²   Smoking Status Former   BSA 2.03 m²        LABS  Lab Results   Component Value Date/Time    BSNMSTNL15 973 (H) 2023 0832    EHYD2ZQ 125 2023 0814    TSH 1.26 2023 0832    FOLATE 19.0 2023 0832    " "PTH 32.0 03/20/2023 0814    VITD25 27 (L) 09/30/2023 0832    TIBC 299 09/30/2023 0832    IRON 81 09/30/2023 0832    FERRITIN 135 09/30/2023 0832    ZINC 102 05/28/2022 1049           No lab exists for component: \"LABALBU\"  Lab Results   Component Value Date    GLUCOSE 86 09/30/2023    CALCIUM 9.1 09/30/2023     09/30/2023    K 3.7 09/30/2023    CO2 27 09/30/2023     09/30/2023    BUN 11 09/30/2023    CREATININE 0.58 09/30/2023       ROS  Review of Systems  + \"annoying pain in mid abdomen 4/10 at its worst\"  All other systems are negative unless noted in HPI     PHYSICAL EXAM  Physical Exam  General- No acute distress, well appearing and well nourished.  HEENT - WNL  Pulmonary - respiratory effort normal  Skin - no visible rashes or lesions  Neurologic -  WNL, no obvious abnormalities   Psychiatric - AXO x3, mood and affect appropriate      ASSESSMENT/PLAN  Patient here for 18 months follow up on LSG with c/o periumbilical abdominal pain 4/10 at the worst  Assessment/Plan   Problem List Items Addressed This Visit       S/P laparoscopic sleeve gastrectomy    Relevant Orders    FL upper GI w KUB    Periumbilical abdominal pain - Primary    Relevant Orders    CT abdomen pelvis wo IV contrast    FL upper GI w KUB    Overweight (BMI 25.0-29.9)      Doing well with diet and exercise. Concern for persistent periumbilical pain 4/10   Total Weight Loss of: 95 lbs  Current BMI: 28.8  Follow up with bariatric surgeon () after diagnostic tests are done      > 50% of time spent counseling on the importance of following recommended dietary modifications including: role of diet, low calorie and carbohydrate restrictions, limiting fast food and avoiding high sugary beverages.   Also discussed, the role of exercise with an ultimate goal of at least 250-300 minutes a week for weight loss and weight maintenance.         "

## 2024-03-19 ENCOUNTER — APPOINTMENT (OUTPATIENT)
Dept: SURGERY | Facility: CLINIC | Age: 39
End: 2024-03-19
Payer: COMMERCIAL

## 2024-04-16 PROBLEM — E66.3 OVERWEIGHT (BMI 25.0-29.9): Status: ACTIVE | Noted: 2024-04-16

## 2024-04-29 ENCOUNTER — HOSPITAL ENCOUNTER (OUTPATIENT)
Dept: RADIOLOGY | Facility: HOSPITAL | Age: 39
Discharge: HOME | End: 2024-04-29
Payer: COMMERCIAL

## 2024-04-29 DIAGNOSIS — R10.33 PERIUMBILICAL ABDOMINAL PAIN: ICD-10-CM

## 2024-04-29 DIAGNOSIS — Z98.84 S/P LAPAROSCOPIC SLEEVE GASTRECTOMY: ICD-10-CM

## 2024-04-29 PROCEDURE — 2500000001 HC RX 250 WO HCPCS SELF ADMINISTERED DRUGS (ALT 637 FOR MEDICARE OP)

## 2024-04-29 PROCEDURE — 74240 X-RAY XM UPR GI TRC 1CNTRST: CPT

## 2024-04-29 PROCEDURE — 74240 X-RAY XM UPR GI TRC 1CNTRST: CPT | Performed by: RADIOLOGY

## 2024-04-29 RX ADMIN — BARIUM SULFATE 200 ML: 960 POWDER, FOR SUSPENSION ORAL at 09:13

## 2024-04-30 ENCOUNTER — HOSPITAL ENCOUNTER (OUTPATIENT)
Dept: RADIOLOGY | Facility: CLINIC | Age: 39
Discharge: HOME | End: 2024-04-30
Payer: COMMERCIAL

## 2024-04-30 DIAGNOSIS — R10.33 PERIUMBILICAL ABDOMINAL PAIN: ICD-10-CM

## 2024-04-30 PROCEDURE — 74176 CT ABD & PELVIS W/O CONTRAST: CPT

## 2024-04-30 PROCEDURE — A9698 NON-RAD CONTRAST MATERIALNOC: HCPCS

## 2024-04-30 PROCEDURE — 2550000001 HC RX 255 CONTRASTS

## 2024-04-30 PROCEDURE — 74176 CT ABD & PELVIS W/O CONTRAST: CPT | Performed by: RADIOLOGY

## 2024-04-30 RX ADMIN — IOHEXOL 500 ML: 12 SOLUTION ORAL at 16:37

## 2024-06-18 ENCOUNTER — APPOINTMENT (OUTPATIENT)
Dept: PRIMARY CARE | Facility: CLINIC | Age: 39
End: 2024-06-18
Payer: COMMERCIAL

## 2024-06-25 ENCOUNTER — APPOINTMENT (OUTPATIENT)
Dept: PRIMARY CARE | Facility: CLINIC | Age: 39
End: 2024-06-25
Payer: COMMERCIAL

## 2024-06-25 DIAGNOSIS — I10 HYPERTENSION, UNSPECIFIED TYPE: Primary | ICD-10-CM

## 2024-06-25 DIAGNOSIS — R60.0 BILATERAL LEG EDEMA: ICD-10-CM

## 2024-06-25 DIAGNOSIS — E55.9 VITAMIN D DEFICIENCY: ICD-10-CM

## 2024-06-25 DIAGNOSIS — Z00.00 ANNUAL PHYSICAL EXAM: ICD-10-CM

## 2024-06-25 DIAGNOSIS — E55.9 HYPOVITAMINOSIS D: ICD-10-CM

## 2024-06-25 PROCEDURE — 99214 OFFICE O/P EST MOD 30 MIN: CPT | Performed by: INTERNAL MEDICINE

## 2024-06-25 RX ORDER — AMLODIPINE BESYLATE 10 MG/1
10 TABLET ORAL DAILY
Qty: 90 TABLET | Refills: 1 | Status: SHIPPED | OUTPATIENT
Start: 2024-06-25 | End: 2024-12-22

## 2024-06-25 RX ORDER — PROPRANOLOL HYDROCHLORIDE 40 MG/1
40 TABLET ORAL 2 TIMES DAILY
Qty: 180 TABLET | Refills: 0 | Status: SHIPPED | OUTPATIENT
Start: 2024-06-25

## 2024-06-25 RX ORDER — ACETAMINOPHEN 500 MG
5000 TABLET ORAL DAILY
Qty: 90 TABLET | Refills: 1 | Status: SHIPPED | OUTPATIENT
Start: 2024-06-25

## 2024-06-25 RX ORDER — LISINOPRIL 10 MG/1
10 TABLET ORAL DAILY
Qty: 90 TABLET | Refills: 0 | Status: SHIPPED | OUTPATIENT
Start: 2024-06-25 | End: 2024-12-22

## 2024-06-25 ASSESSMENT — ENCOUNTER SYMPTOMS
RESPIRATORY NEGATIVE: 1
GASTROINTESTINAL NEGATIVE: 1
CONSTITUTIONAL NEGATIVE: 1

## 2024-06-25 NOTE — PROGRESS NOTES
Chief Complaint:   Chief Complaint   Patient presents with    Follow-up      HPI    Cindy Preciado is a 39 y.o. year old female who presents to the clinic for follow up    Past Medical History   Past Medical History:   Diagnosis Date    Body mass index (BMI) 36.0-36.9, adult 2022    BMI 36.0-36.9,adult    Body mass index (BMI) 37.0-37.9, adult 10/06/2022    BMI 37.0-37.9, adult    Body mass index (BMI)40.0-44.9, adult 10/06/2022    Body mass index (BMI) of 40.0 to 44.9 in adult    Hyperlipidemia, unspecified     Borderline hyperlipidemia    Morbid (severe) obesity due to excess calories (Multi) 2022    Obesity, Class III, BMI 40-49.9 (morbid obesity)    Personal history of other specified conditions 2022    History of postoperative nausea and vomiting    Polyphagia 2020    Polyphagia      Past Surgical History:   Past Surgical History:   Procedure Laterality Date    OTHER SURGICAL HISTORY  2022    Intrauterine device removal    OTHER SURGICAL HISTORY  2022    Salpingo-oophorectomy    OTHER SURGICAL HISTORY  2022     section    OTHER SURGICAL HISTORY  2022    Hysterectomy    OTHER SURGICAL HISTORY  2022    Sleeve gastrectomy     Family History:   Family History   Problem Relation Name Age of Onset    Moyamoya disease Mother      Diabetes Paternal Grandmother      Hypertension Paternal Grandfather       Social History:   Tobacco Use: Medium Risk (2023)    Patient History     Smoking Tobacco Use: Former     Smokeless Tobacco Use: Never     Passive Exposure: Not on file      Social History     Substance and Sexual Activity   Alcohol Use Not Currently        Allergies:   No Known Allergies     ROS   Review of Systems   Constitutional: Negative.    Respiratory: Negative.     Cardiovascular:  Positive for leg swelling.   Gastrointestinal: Negative.         Objective   There were no vitals filed for this visit.   BMI Readings from Last 15 Encounters:  "  03/18/24 28.89 kg/m²   03/12/24 28.74 kg/m²   11/07/23 28.01 kg/m²   09/22/23 28.28 kg/m²   08/25/23 29.19 kg/m²   06/28/23 31.02 kg/m²   03/20/23 32.39 kg/m²   03/10/23 32.99 kg/m²   02/07/23 34.55 kg/m²   12/08/22 35.05 kg/m²   11/18/22 36.34 kg/m²   11/03/22 36.34 kg/m²   10/06/22 37.73 kg/m²   10/03/22 38.62 kg/m²   09/08/22 41.66 kg/m²      86.2 kg (190 lb) (3/18/2024  7:00 AM)      Physical Exam  Physical Exam  Neurological:      Mental Status: She is alert.   Psychiatric:         Mood and Affect: Mood normal.          Labs:  CBC:  Recent Labs     03/20/23  0814 12/03/22  0945 09/20/22  0629   WBC 5.5 9.6 9.2   HGB 12.3 12.0 11.0*   HCT 39.6 37.9 33.0*    354 347   MCV 97 95 93     CMP:  Recent Labs     09/30/23  0832 06/28/23  0826 03/24/23  0747    139 138   K 3.7 4.5 3.9    106 104   CO2 27 26 30   ANIONGAP 11 12 8*   BUN 11 18 14   CREATININE 0.58 0.75 0.62   EGFR >90  --   --    GLUCOSE 86 85 84     Recent Labs     09/30/23  0832 03/20/23  0814 12/03/22  0945   ALBUMIN 4.1 4.1 4.0   ALKPHOS 45 48 48   ALT 10 14 8   AST 14 23 15   BILITOT 0.7 0.7 0.8     Calcium/Phos:   Lab Results   Component Value Date    CALCIUM 9.1 09/30/2023      COAG:   Recent Labs     05/28/22  1049 12/18/20  1031   INR 1.1 1.1     CRP:   Lab Results   Component Value Date    CRP 0.41 05/28/2022      [unfilled]   ENDO:  Recent Labs     09/30/23  0832 03/20/23  0814 12/03/22  0945 05/28/22  1049 01/03/22  1047   TSH 1.26  --   --  2.02 1.70   HGBA1C  --  4.3 4.4 4.8 4.7      CARDIAC: No results for input(s): \"LDH\", \"CKMB\", \"TROPHS\", \"BNP\" in the last 15495 hours.    No lab exists for component: \"CK\", \"CKMBP\"  Recent Labs     09/30/23  0832 05/28/22  1049 01/03/22  1047   CHOL 209* 178 248*   LDLF  --  110* 168*   LDLCALC 138*  --   --    HDL 62.3 54.4 63.9   TRIG 46 67 81     No data recorded    Current Medications:  Current Outpatient Medications   Medication Sig Dispense Refill    amLODIPine (Norvasc) 10 " mg tablet Take 1 tablet (10 mg) by mouth once daily. 90 tablet 1    calcium citrate (Calcitrate) 200 mg (950 mg) tablet Take 1 tablet (200 mg) by mouth 3 times a day.      cholecalciferol (Vitamin D3) 5,000 Units tablet Take 1 tablet (5,000 Units) by mouth once daily. 90 tablet 1    lisinopril 10 mg tablet Take 1 tablet (10 mg) by mouth once daily. 90 tablet 0    mv-min/iron/folic/calcium/vitK (WOMEN'S MULTIVITAMIN ORAL) Take 1 tablet by mouth 1 (one) time each day.      propranolol (Inderal) 40 mg tablet Take 1 tablet (40 mg) by mouth 2 times a day. 180 tablet 0     No current facility-administered medications for this visit.       Assessment and Plan  Cindy was seen today for follow-up.  Diagnoses and all orders for this visit:  Hypertension, unspecified type (Primary)  -     amLODIPine (Norvasc) 10 mg tablet; Take 1 tablet (10 mg) by mouth once daily.  -     lisinopril 10 mg tablet; Take 1 tablet (10 mg) by mouth once daily.  -     propranolol (Inderal) 40 mg tablet; Take 1 tablet (40 mg) by mouth 2 times a day.  Vitamin D deficiency  Hypovitaminosis D  -     cholecalciferol (Vitamin D3) 5,000 Units tablet; Take 1 tablet (5,000 Units) by mouth once daily.  Bilateral leg edema  Annual physical exam  -     BI mammo bilateral screening tomosynthesis; Future     HTN  Stable  Continue with current  Checks BP occasionally    Leg edema  Got worse during hot days  Better now  Likely due to Amlodipine  Try changing the dose to 5 mg with close BP monitoring  Let us know if swelling is not better  Wear compression stockings during the day on hot day  Try to move more    Vitamin D def  On high dose vitamin D    Due for a  physical        Immunizations:  Immunization History   Administered Date(s) Administered    Influenza, seasonal, injectable 10/18/2022    [employer] Purple Cap SARS-CoV-2 06/26/2021, 07/17/2021, 01/22/2022     Please follow up in 3 months    An interactive audio and video telecommunication system which permits  real time communications between the patient (at the originating site) and provider (at the distant site) was utilized to provide this telehealth service.    Verbal consent was requested and obtained from the patient on the day of encounter.  This is a virtual visit using HIPAA compliant video platform. It required patient-provider interaction for the medical decision making as documented.     I have communicated my name and active licensure. The patient's identity and physical location were verified at the time of this visit.   Either the patient or their legal representative has been informed of the risks and benefits of -- and alternatives to -- treatment through a remote evaluation and consents to proceed with the evaluation remotely.

## 2024-07-08 ENCOUNTER — HOSPITAL ENCOUNTER (OUTPATIENT)
Dept: RADIOLOGY | Facility: CLINIC | Age: 39
Discharge: HOME | End: 2024-07-08
Payer: COMMERCIAL

## 2024-07-08 VITALS — BODY MASS INDEX: 30.16 KG/M2 | WEIGHT: 199 LBS | HEIGHT: 68 IN

## 2024-07-08 DIAGNOSIS — Z00.00 ANNUAL PHYSICAL EXAM: ICD-10-CM

## 2024-07-08 PROCEDURE — 77067 SCR MAMMO BI INCL CAD: CPT | Performed by: RADIOLOGY

## 2024-07-08 PROCEDURE — 77063 BREAST TOMOSYNTHESIS BI: CPT | Performed by: RADIOLOGY

## 2024-07-08 PROCEDURE — 77067 SCR MAMMO BI INCL CAD: CPT

## 2024-08-30 ENCOUNTER — APPOINTMENT (OUTPATIENT)
Dept: LAB | Facility: LAB | Age: 39
End: 2024-08-30
Payer: COMMERCIAL

## 2024-08-30 ENCOUNTER — APPOINTMENT (OUTPATIENT)
Dept: PRIMARY CARE | Facility: CLINIC | Age: 39
End: 2024-08-30
Payer: COMMERCIAL

## 2024-08-30 VITALS
SYSTOLIC BLOOD PRESSURE: 138 MMHG | DIASTOLIC BLOOD PRESSURE: 92 MMHG | WEIGHT: 213 LBS | BODY MASS INDEX: 32.28 KG/M2 | HEIGHT: 68 IN

## 2024-08-30 DIAGNOSIS — E78.5 HYPERLIPIDEMIA, UNSPECIFIED HYPERLIPIDEMIA TYPE: ICD-10-CM

## 2024-08-30 DIAGNOSIS — E55.9 HYPOVITAMINOSIS D: ICD-10-CM

## 2024-08-30 DIAGNOSIS — I10 HYPERTENSION, UNSPECIFIED TYPE: Primary | ICD-10-CM

## 2024-08-30 DIAGNOSIS — G43.909 MIGRAINE WITHOUT STATUS MIGRAINOSUS, NOT INTRACTABLE, UNSPECIFIED MIGRAINE TYPE: ICD-10-CM

## 2024-08-30 DIAGNOSIS — Z00.00 HEALTH CARE MAINTENANCE: ICD-10-CM

## 2024-08-30 LAB
25(OH)D3 SERPL-MCNC: 29 NG/ML (ref 30–100)
ALBUMIN SERPL BCP-MCNC: 4.1 G/DL (ref 3.4–5)
ALP SERPL-CCNC: 52 U/L (ref 33–110)
ALT SERPL W P-5'-P-CCNC: 12 U/L (ref 7–45)
ANION GAP SERPL CALC-SCNC: 12 MMOL/L (ref 10–20)
AST SERPL W P-5'-P-CCNC: 16 U/L (ref 9–39)
BILIRUB SERPL-MCNC: 1 MG/DL (ref 0–1.2)
BUN SERPL-MCNC: 11 MG/DL (ref 6–23)
CALCIUM SERPL-MCNC: 9.5 MG/DL (ref 8.6–10.6)
CHLORIDE SERPL-SCNC: 104 MMOL/L (ref 98–107)
CHOLEST SERPL-MCNC: 209 MG/DL (ref 0–199)
CHOLESTEROL/HDL RATIO: 2.9
CO2 SERPL-SCNC: 28 MMOL/L (ref 21–32)
CREAT SERPL-MCNC: 0.68 MG/DL (ref 0.5–1.05)
CREAT UR-MCNC: 154.9 MG/DL (ref 20–320)
EGFRCR SERPLBLD CKD-EPI 2021: >90 ML/MIN/1.73M*2
ERYTHROCYTE [DISTWIDTH] IN BLOOD BY AUTOMATED COUNT: 12.4 % (ref 11.5–14.5)
EST. AVERAGE GLUCOSE BLD GHB EST-MCNC: 71 MG/DL
GLUCOSE SERPL-MCNC: 87 MG/DL (ref 74–99)
HBA1C MFR BLD: 4.1 %
HCT VFR BLD AUTO: 36.2 % (ref 36–46)
HDLC SERPL-MCNC: 73.2 MG/DL
HGB BLD-MCNC: 11.4 G/DL (ref 12–16)
LDLC SERPL CALC-MCNC: 127 MG/DL
MCH RBC QN AUTO: 30.4 PG (ref 26–34)
MCHC RBC AUTO-ENTMCNC: 31.5 G/DL (ref 32–36)
MCV RBC AUTO: 97 FL (ref 80–100)
MICROALBUMIN UR-MCNC: 13.4 MG/L
MICROALBUMIN/CREAT UR: 8.7 UG/MG CREAT
NON HDL CHOLESTEROL: 136 MG/DL (ref 0–149)
NRBC BLD-RTO: 0 /100 WBCS (ref 0–0)
PLATELET # BLD AUTO: 209 X10*3/UL (ref 150–450)
POTASSIUM SERPL-SCNC: 4 MMOL/L (ref 3.5–5.3)
PROT SERPL-MCNC: 7.2 G/DL (ref 6.4–8.2)
RBC # BLD AUTO: 3.75 X10*6/UL (ref 4–5.2)
SODIUM SERPL-SCNC: 140 MMOL/L (ref 136–145)
TRIGL SERPL-MCNC: 42 MG/DL (ref 0–149)
TSH SERPL-ACNC: 1.56 MIU/L (ref 0.44–3.98)
VLDL: 8 MG/DL (ref 0–40)
WBC # BLD AUTO: 5.5 X10*3/UL (ref 4.4–11.3)

## 2024-08-30 PROCEDURE — 3008F BODY MASS INDEX DOCD: CPT | Performed by: INTERNAL MEDICINE

## 2024-08-30 PROCEDURE — 83036 HEMOGLOBIN GLYCOSYLATED A1C: CPT

## 2024-08-30 PROCEDURE — 3080F DIAST BP >= 90 MM HG: CPT | Performed by: INTERNAL MEDICINE

## 2024-08-30 PROCEDURE — 82570 ASSAY OF URINE CREATININE: CPT

## 2024-08-30 PROCEDURE — 80053 COMPREHEN METABOLIC PANEL: CPT

## 2024-08-30 PROCEDURE — 82043 UR ALBUMIN QUANTITATIVE: CPT

## 2024-08-30 PROCEDURE — 82306 VITAMIN D 25 HYDROXY: CPT

## 2024-08-30 PROCEDURE — 99396 PREV VISIT EST AGE 40-64: CPT | Performed by: INTERNAL MEDICINE

## 2024-08-30 PROCEDURE — 84443 ASSAY THYROID STIM HORMONE: CPT

## 2024-08-30 PROCEDURE — 85027 COMPLETE CBC AUTOMATED: CPT

## 2024-08-30 PROCEDURE — 80061 LIPID PANEL: CPT

## 2024-08-30 PROCEDURE — 3075F SYST BP GE 130 - 139MM HG: CPT | Performed by: INTERNAL MEDICINE

## 2024-08-30 PROCEDURE — 36415 COLL VENOUS BLD VENIPUNCTURE: CPT

## 2024-08-30 RX ORDER — AMLODIPINE BESYLATE 5 MG/1
5 TABLET ORAL DAILY
Qty: 30 TABLET | Refills: 5 | Status: SHIPPED | OUTPATIENT
Start: 2024-08-30 | End: 2025-02-26

## 2024-08-30 RX ORDER — ACETAMINOPHEN 500 MG
5000 TABLET ORAL DAILY
Qty: 90 TABLET | Refills: 1 | Status: SHIPPED | OUTPATIENT
Start: 2024-08-30

## 2024-08-30 RX ORDER — PROPRANOLOL HYDROCHLORIDE 40 MG/1
40 TABLET ORAL 2 TIMES DAILY
Qty: 180 TABLET | Refills: 1 | Status: SHIPPED | OUTPATIENT
Start: 2024-08-30

## 2024-08-30 RX ORDER — LISINOPRIL AND HYDROCHLOROTHIAZIDE 10; 12.5 MG/1; MG/1
1 TABLET ORAL DAILY
Qty: 90 TABLET | Refills: 1 | Status: SHIPPED | OUTPATIENT
Start: 2024-08-30 | End: 2025-02-26

## 2024-08-30 ASSESSMENT — ENCOUNTER SYMPTOMS
CHILLS: 0
NAUSEA: 0
FEVER: 0
CHEST TIGHTNESS: 0
SHORTNESS OF BREATH: 0
ABDOMINAL PAIN: 0

## 2024-08-30 ASSESSMENT — LIFESTYLE VARIABLES: HOW MANY STANDARD DRINKS CONTAINING ALCOHOL DO YOU HAVE ON A TYPICAL DAY: PATIENT DOES NOT DRINK

## 2024-08-30 NOTE — PROGRESS NOTES
I reviewed the resident/fellow's documentation and discussed the patient with the resident/fellow. I agree with the resident/fellow's medical decision making as documented in the note.  As the attending physician, I certify that I personally reviewed the patient's history and personally examined the patient to confirm the physical findings described above, and that I reviewed the relevant imaging studies and available reports. I also discussed the differential diagnosis and all of the proposed management plans with the patient and individuals accompanying the patient to this visit. They had the opportunity to ask questions about the proposed management plans and to have those questions answered.     Jerilyn Pal MD

## 2024-08-30 NOTE — ASSESSMENT & PLAN NOTE
Elevated bp today 160s and then 138/92. 130s / 80s at home  Will change to lisinopril - hydrochlorothiazide 10-12.5   Continue amlodipine as 5 daily

## 2024-08-30 NOTE — PROGRESS NOTES
"Subjective   Patient ID: Cindy Preciado is a 39 y.o. female who presents for CPE.    39f with HTN, hx vitamin D, hx bariatric surgery, hx migraines, MARIA LUISA who presents for a follow up visit.   165/98 this visit, 138/92 on recheck. Bp at home 130s/80s per pt.   She was told to cut her amlodipine 10 in half to 5 due to BLE swelling a few weeks ago. It has felt a bit better in terms of swelling since.   She says she sits at her desk all day. She used to take lisinopril - hctz and stopped hctz after weight reduction from gastric sleeve surgery.   Great aunt father side had colon cancer but not in father.            Review of Systems   Constitutional:  Negative for chills and fever.   Respiratory:  Negative for chest tightness and shortness of breath.    Cardiovascular:  Negative for chest pain.   Gastrointestinal:  Negative for abdominal pain and nausea.   All other systems reviewed and are negative.      Objective   BP (!) 138/92 (BP Location: Left arm, Patient Position: Sitting)   Ht 1.727 m (5' 8\")   Wt 96.6 kg (213 lb)   BMI 32.39 kg/m²     Physical Exam  Vitals reviewed.   Constitutional:       General: She is not in acute distress.  HENT:      Head: Normocephalic and atraumatic.   Eyes:      Extraocular Movements: Extraocular movements intact.      Conjunctiva/sclera: Conjunctivae normal.   Cardiovascular:      Rate and Rhythm: Normal rate and regular rhythm.      Heart sounds: No murmur heard.     No friction rub. No gallop.   Pulmonary:      Effort: Pulmonary effort is normal.      Breath sounds: Normal breath sounds. No wheezing, rhonchi or rales.   Abdominal:      General: Bowel sounds are normal.      Palpations: Abdomen is soft. There is no mass.      Tenderness: There is no abdominal tenderness. There is no guarding or rebound.   Musculoskeletal:         General: No tenderness.      Cervical back: Neck supple.      Right lower leg: No edema.      Left lower leg: No edema.   Skin:     General: Skin is " warm and dry.      Findings: No bruising or rash.   Neurological:      Mental Status: She is alert. Mental status is at baseline.      Comments: Answering questions, following commands. Moving all extremities.    Psychiatric:         Mood and Affect: Mood and affect normal.         Assessment/Plan   Problem List Items Addressed This Visit       HTN (hypertension) - Primary     Elevated bp today 160s and then 138/92. 130s / 80s at home  Will change to lisinopril - hydrochlorothiazide 10-12.5   Continue amlodipine as 5 daily           Relevant Medications    lisinopriL-hydrochlorothiazide 10-12.5 mg tablet    amLODIPine (Norvasc) 5 mg tablet    propranolol (Inderal) 40 mg tablet    Other Relevant Orders    Comprehensive metabolic panel    Albumin-Creatinine Ratio, Urine Random    Migraine     Ha well controlled, not had migrine for years  Continue propranolol            Hypovitaminosis D    Relevant Medications    cholecalciferol (Vitamin D3) 5,000 Units tablet    Other Relevant Orders    Vitamin D 25-Hydroxy,Total (for eval of Vitamin D levels)    Health care maintenance    Relevant Orders    CBC    TSH with reflex to Free T4 if abnormal    Hemoglobin A1c    HLD (hyperlipidemia)    Relevant Orders    Lipid Panel     #health Maintenance  Mammogram - last 7/2024 next 7/2025   PAP - hx hysterectomy   Labs: get this visit    Immunizations: Shingles vaccine can get at pharmacy, flu shot can get in fall, tetanus vaccine last recorded 6/2010 discuss shot if available

## 2024-09-03 NOTE — PROGRESS NOTES
"Follow Up Bariatric Nutrition Assessment                  Name: Cindy Preciado  MRN: [id number]  Date: 09/10/24    Surgery Date: 9-19-22  Surgeon: Munir  Procedure: sleeve gastrectomy    ASSESSMENT:  Current weight:      Vitals:    09/10/24 0840   Weight: 90.3 kg (199 lb)                 Ht: 1.727 m (5' 8\")  BMI:  Body mass index is 30.26 kg/m².  Previous weight:   189lbs  Initial start weight:   285lbs  EBW:  121lbs  Total weight change:  86lbs  %EBW Lost: 71%    PROGRESS:    Nutrition Interventions for last encounter    Eat at least 80 g of protein per day for muscle building with exercise routine.   Drink at least 64 oz. of zero calorie beverages per day  Continue no drinking 30 min before, during the meal and for 30 minutes after the meal  Increase intensity and duration of exercise  Continue current vit/min regimen      CHANGES IN TREATMENT:   Patient met goals: met    24 hour food recall:   Breakfast: protein shake w/coffee, eggs w/sausage sometimes with fruit  Snack: greek yogurt or fruit cups   Lunch: low carb wrap w/tuna or lunch meat   Snack:     Dinner: chicken/ground turkey w/veggies   Snack: 100kcal snack pack   Beverages: water, CL and coffee w/protein shake w/SF syrup   Alcohol: rarely       Vitamins:  2 Flintstones MVI, 1500 mg Calcium, and 1000 mcg. B12 (will take 3x/wk), iron started 75mg, vitamin D 5,000 mcg   Medications:   Current Outpatient Medications:     amLODIPine (Norvasc) 5 mg tablet, Take 1 tablet (5 mg) by mouth once daily., Disp: 30 tablet, Rfl: 5    calcium citrate (Calcitrate) 200 mg (950 mg) tablet, Take 1 tablet (200 mg) by mouth 3 times a day., Disp: , Rfl:     cholecalciferol (Vitamin D3) 5,000 Units tablet, Take 1 tablet (5,000 Units) by mouth once daily., Disp: 90 tablet, Rfl: 1    lisinopriL-hydrochlorothiazide 10-12.5 mg tablet, Take 1 tablet by mouth once daily., Disp: 90 tablet, Rfl: 1    mv-min/iron/folic/calcium/vitK (WOMEN'S MULTIVITAMIN ORAL), Take 1 tablet by mouth 1 " (one) time each day., Disp: , Rfl:     propranolol (Inderal) 40 mg tablet, Take 1 tablet (40 mg) by mouth 2 times a day., Disp: 180 tablet, Rfl: 1  Physical Activity: gym 5 days (cardio and weight) a week 60 minutes - has been increasing weights and intensity as exercises get easier     READINESS TO LEARN:  Motivation to learn:  Interested      Understanding of instruction: Good       Anticipated Compliance:  Good       Family Support: Unable to assess-family not present     Patient presents with post-op weight loss surgery sleeve gastrectomy. Pt is 2 years post op.   Patient has gained 10lbs since March.  Has had more temptation foods in the house over the summer, but has been working on limiting her snacks to moderation.  Tolerating regular diet without difficulty.  Protein intake is adequate for post-op individual. Fluid consumption is not always adequate. Patient is supplementing recommended vitamin/minerals. Pt states no concerns/difficulties. Currently happy with her progress so far.     Malnutrition Screening  Significant unintentional weight loss? n/a  Eating less than 75% of usual intake for more than 2 weeks? n/a     Nutrition Diagnosis:   Increased protein and nutrition needs related to altered GI function as evidenced by pt. s/p sleeve gastrectomy.  Food- and nutrition-related knowledge deficit related to lack of prior exposure to surgical weight loss information as evidenced by diet recall.     Nutrition Interventions:   Modify type and amount of food and nutrients within meals and snacks.  Comprehensive Nutrition Education  -Nutrition education materials:         Recommendations:    Eat 70-80 g of protein per day.  Limit special snacks to 1-2x per week. Have them in moderation.   Drink 64 oz. of zero calorie beverages per day  Continue no drinking 30 min before, during the meal and for 30 minutes after the meal  Increase intensity and duration of exercise  Continue current vit/min regimen    Nutrition  Monitoring and Evaluation:   1-2 pounds weight loss per week  Criteria: weight check, food recall  Need for Follow-up: annually or PRN    Cris Antoine MS, RD, LD  Phone: [phone]

## 2024-09-10 ENCOUNTER — APPOINTMENT (OUTPATIENT)
Dept: SURGERY | Facility: CLINIC | Age: 39
End: 2024-09-10
Payer: COMMERCIAL

## 2024-09-10 VITALS — WEIGHT: 199 LBS | HEIGHT: 68 IN | BODY MASS INDEX: 30.16 KG/M2

## 2024-12-20 ENCOUNTER — APPOINTMENT (OUTPATIENT)
Dept: PRIMARY CARE | Facility: CLINIC | Age: 39
End: 2024-12-20
Payer: COMMERCIAL

## 2024-12-20 VITALS
SYSTOLIC BLOOD PRESSURE: 152 MMHG | BODY MASS INDEX: 32.58 KG/M2 | WEIGHT: 215 LBS | HEART RATE: 59 BPM | DIASTOLIC BLOOD PRESSURE: 92 MMHG | HEIGHT: 68 IN

## 2024-12-20 DIAGNOSIS — I10 HYPERTENSION, UNSPECIFIED TYPE: ICD-10-CM

## 2024-12-20 DIAGNOSIS — I10 PRIMARY HYPERTENSION: Primary | ICD-10-CM

## 2024-12-20 DIAGNOSIS — R01.1 MURMUR: ICD-10-CM

## 2024-12-20 PROBLEM — L85.3 XEROSIS CUTIS: Status: ACTIVE | Noted: 2020-08-20

## 2024-12-20 PROBLEM — Z90.710 S/P HYSTERECTOMY: Status: ACTIVE | Noted: 2017-12-28

## 2024-12-20 PROCEDURE — 99214 OFFICE O/P EST MOD 30 MIN: CPT | Performed by: INTERNAL MEDICINE

## 2024-12-20 PROCEDURE — 3077F SYST BP >= 140 MM HG: CPT | Performed by: INTERNAL MEDICINE

## 2024-12-20 PROCEDURE — 3008F BODY MASS INDEX DOCD: CPT | Performed by: INTERNAL MEDICINE

## 2024-12-20 PROCEDURE — 1036F TOBACCO NON-USER: CPT | Performed by: INTERNAL MEDICINE

## 2024-12-20 PROCEDURE — 3080F DIAST BP >= 90 MM HG: CPT | Performed by: INTERNAL MEDICINE

## 2024-12-20 RX ORDER — LISINOPRIL AND HYDROCHLOROTHIAZIDE 10; 12.5 MG/1; MG/1
1 TABLET ORAL DAILY
Qty: 90 TABLET | Refills: 1 | Status: SHIPPED | OUTPATIENT
Start: 2024-12-20 | End: 2025-06-18

## 2024-12-20 RX ORDER — PROPRANOLOL HYDROCHLORIDE 40 MG/1
40 TABLET ORAL 2 TIMES DAILY
Qty: 180 TABLET | Refills: 1 | Status: SHIPPED | OUTPATIENT
Start: 2024-12-20

## 2024-12-20 RX ORDER — AMLODIPINE BESYLATE 5 MG/1
5 TABLET ORAL DAILY
Qty: 30 TABLET | Refills: 5 | Status: SHIPPED | OUTPATIENT
Start: 2024-12-20 | End: 2025-06-18

## 2024-12-20 ASSESSMENT — LIFESTYLE VARIABLES
HOW MANY STANDARD DRINKS CONTAINING ALCOHOL DO YOU HAVE ON A TYPICAL DAY: PATIENT DOES NOT DRINK
SKIP TO QUESTIONS 9-10: 1
AUDIT-C TOTAL SCORE: 0
HOW OFTEN DO YOU HAVE SIX OR MORE DRINKS ON ONE OCCASION: NEVER
HOW OFTEN DO YOU HAVE A DRINK CONTAINING ALCOHOL: NEVER

## 2024-12-20 NOTE — ASSESSMENT & PLAN NOTE
Unclear if pt has white coat syndrome. 150s/90s in office today. 130s/70s at home  Pt instructed to bring in 2 week log of BP readings at home   Will make decision based off that.     Continue amlod 5, lisinopril hctz 10-12.5, propranolol 40 bid for now

## 2024-12-20 NOTE — ASSESSMENT & PLAN NOTE
2/6 systolic murmur to R second intercostal space. Asymptomatic   Watch for now, consider echo if needed

## 2024-12-20 NOTE — PROGRESS NOTES
"Subjective   Patient ID: Cindy Preciado is a 39 y.o. female who presents for Follow-up (Refills).    39f with HTN, hx vitamin D, hx bariatric surgery, hx migraines, MARIA LUISA who presents for a follow up visit.  BP meds changed to lisinopril hctz 10-12.5 and continued amlodipine 5 last visit.   This visit bp is 156/91. 152/92 on recheck. home BP has been \"ok\". 130/70 at home. Denies CP including on exertion, dyspnea, abd pain, HA, diarrhea.   Taking po vitamin D.            Review of Systems    Objective   BP (!) 152/92 (BP Location: Left arm, Patient Position: Sitting, BP Cuff Size: Large adult)   Pulse 59   Ht 1.727 m (5' 8\")   Wt 97.5 kg (215 lb)   BMI 32.69 kg/m²     Physical Exam  Vitals reviewed.   Constitutional:       General: She is not in acute distress.  HENT:      Head: Normocephalic and atraumatic.   Eyes:      Extraocular Movements: Extraocular movements intact.      Conjunctiva/sclera: Conjunctivae normal.   Cardiovascular:      Rate and Rhythm: Normal rate and regular rhythm.      Heart sounds: Murmur heard.      No friction rub. No gallop.      Comments: 2/6 systolic murmur R second intercostal space     Pulmonary:      Effort: Pulmonary effort is normal.      Breath sounds: Normal breath sounds. No wheezing, rhonchi or rales.   Abdominal:      General: Bowel sounds are normal.      Palpations: Abdomen is soft. There is no mass.      Tenderness: There is no abdominal tenderness. There is no guarding or rebound.   Musculoskeletal:         General: No tenderness.      Cervical back: Neck supple.      Right lower leg: No edema.      Left lower leg: No edema.   Skin:     General: Skin is warm and dry.      Findings: No bruising or rash.   Neurological:      Mental Status: She is alert. Mental status is at baseline.      Comments: Answering questions, following commands. Moving all extremities.    Psychiatric:         Mood and Affect: Mood and affect normal.         Assessment/Plan   Problem List Items " Addressed This Visit       HTN (hypertension) - Primary     Unclear if pt has white coat syndrome. 150s/90s in office today. 130s/70s at home  Pt instructed to bring in 2 week log of BP readings at home   Will make decision based off that.     Continue amlod 5, lisinopril hctz 10-12.5, propranolol 40 bid for now          Relevant Medications    amLODIPine (Norvasc) 5 mg tablet    lisinopriL-hydrochlorothiazide 10-12.5 mg tablet    propranolol (Inderal) 40 mg tablet    Murmur     2/6 systolic murmur to R second intercostal space. Asymptomatic   Watch for now, consider echo if needed            #health Maintenance  Mammogram - last 7/2024 next 7/2025   PAP - hx hysterectomy   Labs: none this visit   Immunizations: Shingles vaccine can get at pharmacy, flu shot 10/31/24, tetanus vaccine last recorded 6/2010, pt will defer this visit, informed about risks.

## 2024-12-20 NOTE — PATIENT INSTRUCTIONS
Please record a 2 week log of your home blood pressures (at least 1 reading a day at night) and send it or drop it off at this office.   We can make a better decision based off that.

## 2024-12-20 NOTE — PROGRESS NOTES
"Subjective   Patient ID: Cindy Preciado is a 39 y.o. female who presents for Follow-up (Refills).    39f with HTN, hx vitamin D, hx bariatric surgery, hx migraines, MARIA LUISA who presents for a follow up visit.  BP meds changed to lisinopril hctz 10-12.5 and continued amlodipine 5 last visit.   This visit bp is 156/91. 152/92 on recheck. home BP has been \"ok\". 130/70 at home. Denies CP including on exertion, dyspnea, abd pain, HA, diarrhea.   Taking po vitamin D.            Review of Systems    Objective   BP (!) 152/92 (BP Location: Left arm, Patient Position: Sitting, BP Cuff Size: Large adult)   Pulse 59   Ht 1.727 m (5' 8\")   Wt 97.5 kg (215 lb)   BMI 32.69 kg/m²     Physical Exam  Vitals reviewed.   Constitutional:       General: She is not in acute distress.  HENT:      Head: Normocephalic and atraumatic.   Eyes:      Extraocular Movements: Extraocular movements intact.      Conjunctiva/sclera: Conjunctivae normal.   Cardiovascular:      Rate and Rhythm: Normal rate and regular rhythm.      Heart sounds: Murmur heard.      No friction rub. No gallop.      Comments: 2/6 systolic murmur R second intercostal space     Pulmonary:      Effort: Pulmonary effort is normal.      Breath sounds: Normal breath sounds. No wheezing, rhonchi or rales.   Abdominal:      General: Bowel sounds are normal.      Palpations: Abdomen is soft. There is no mass.      Tenderness: There is no abdominal tenderness. There is no guarding or rebound.   Musculoskeletal:         General: No tenderness.      Cervical back: Neck supple.      Right lower leg: No edema.      Left lower leg: No edema.   Skin:     General: Skin is warm and dry.      Findings: No bruising or rash.   Neurological:      Mental Status: She is alert. Mental status is at baseline.      Comments: Answering questions, following commands. Moving all extremities.    Psychiatric:         Mood and Affect: Mood and affect normal.         Assessment/Plan   Problem List Items " Addressed This Visit       HTN (hypertension) - Primary     #health Maintenance  Mammogram - last 7/2024 next 7/2025   PAP - hx hysterectomy   Labs: none this visit   Immunizations: Shingles vaccine can get at pharmacy, flu shot 10/31/24, tetanus vaccine last recorded 6/2010, pt will defer this visit, informed her about risks.

## 2025-01-19 DIAGNOSIS — I10 HYPERTENSION, UNSPECIFIED TYPE: Primary | ICD-10-CM

## 2025-01-19 RX ORDER — AMLODIPINE BESYLATE 10 MG/1
10 TABLET ORAL DAILY
Qty: 30 TABLET | Refills: 5 | Status: SHIPPED | OUTPATIENT
Start: 2025-01-19 | End: 2025-07-18

## 2025-02-14 ENCOUNTER — APPOINTMENT (OUTPATIENT)
Dept: PRIMARY CARE | Facility: CLINIC | Age: 40
End: 2025-02-14
Payer: COMMERCIAL

## 2025-03-03 ENCOUNTER — APPOINTMENT (OUTPATIENT)
Dept: PRIMARY CARE | Facility: CLINIC | Age: 40
End: 2025-03-03
Payer: COMMERCIAL

## 2025-03-03 VITALS
SYSTOLIC BLOOD PRESSURE: 167 MMHG | HEART RATE: 54 BPM | BODY MASS INDEX: 32.43 KG/M2 | DIASTOLIC BLOOD PRESSURE: 104 MMHG | WEIGHT: 214 LBS | HEIGHT: 68 IN

## 2025-03-03 DIAGNOSIS — E78.5 HYPERLIPIDEMIA, UNSPECIFIED HYPERLIPIDEMIA TYPE: ICD-10-CM

## 2025-03-03 DIAGNOSIS — E66.3 OVERWEIGHT (BMI 25.0-29.9): ICD-10-CM

## 2025-03-03 DIAGNOSIS — Z98.84 BARIATRIC SURGERY STATUS: ICD-10-CM

## 2025-03-03 DIAGNOSIS — E66.09 CLASS 1 OBESITY DUE TO EXCESS CALORIES WITHOUT SERIOUS COMORBIDITY WITH BODY MASS INDEX (BMI) OF 31.0 TO 31.9 IN ADULT: ICD-10-CM

## 2025-03-03 DIAGNOSIS — E66.811 CLASS 1 OBESITY DUE TO EXCESS CALORIES WITHOUT SERIOUS COMORBIDITY WITH BODY MASS INDEX (BMI) OF 31.0 TO 31.9 IN ADULT: ICD-10-CM

## 2025-03-03 DIAGNOSIS — I10 PRIMARY HYPERTENSION: Primary | ICD-10-CM

## 2025-03-03 DIAGNOSIS — E55.9 VITAMIN D DEFICIENCY: ICD-10-CM

## 2025-03-03 PROCEDURE — 1036F TOBACCO NON-USER: CPT | Performed by: INTERNAL MEDICINE

## 2025-03-03 PROCEDURE — 3077F SYST BP >= 140 MM HG: CPT | Performed by: INTERNAL MEDICINE

## 2025-03-03 PROCEDURE — 3080F DIAST BP >= 90 MM HG: CPT | Performed by: INTERNAL MEDICINE

## 2025-03-03 PROCEDURE — 3008F BODY MASS INDEX DOCD: CPT | Performed by: INTERNAL MEDICINE

## 2025-03-03 PROCEDURE — 99214 OFFICE O/P EST MOD 30 MIN: CPT | Performed by: INTERNAL MEDICINE

## 2025-03-03 RX ORDER — LISINOPRIL AND HYDROCHLOROTHIAZIDE 20; 25 MG/1; MG/1
1 TABLET ORAL DAILY
Qty: 100 TABLET | Refills: 3 | Status: SHIPPED | OUTPATIENT
Start: 2025-03-03 | End: 2026-04-07

## 2025-03-03 ASSESSMENT — LIFESTYLE VARIABLES
AUDIT-C TOTAL SCORE: 0
SKIP TO QUESTIONS 9-10: 1
HOW OFTEN DO YOU HAVE A DRINK CONTAINING ALCOHOL: NEVER
HOW OFTEN DO YOU HAVE SIX OR MORE DRINKS ON ONE OCCASION: NEVER
HOW MANY STANDARD DRINKS CONTAINING ALCOHOL DO YOU HAVE ON A TYPICAL DAY: PATIENT DOES NOT DRINK

## 2025-03-03 ASSESSMENT — PATIENT HEALTH QUESTIONNAIRE - PHQ9
2. FEELING DOWN, DEPRESSED OR HOPELESS: NOT AT ALL
1. LITTLE INTEREST OR PLEASURE IN DOING THINGS: NOT AT ALL
SUM OF ALL RESPONSES TO PHQ9 QUESTIONS 1 AND 2: 0

## 2025-03-03 NOTE — PROGRESS NOTES
I reviewed the resident/fellow's documentation and discussed the patient with the resident/fellow. I agree with the resident/fellow's medical decision making as documented in the note.  As the attending physician,  I reviewed the relevant imaging studies and available reports. I also discussed the differential diagnosis and all of the proposed management plans with the resident.    Jerilyn Pal MD

## 2025-03-03 NOTE — PROGRESS NOTES
[facility]– [place]  Primary Care Office Visit    Chief Complaint:   Chief Complaint   Patient presents with    Follow-up      Cindy Preciado is a 40 y.o. year old female is here for follow-up of chronic medical conditions.   The patient has a med hx of HTN, Vit d def, migraines MARIA LUISA, sp bariatric surgery who presents today for follow of BP.   She takes her blood pressure at home reports that numbers are fluctuating. She is compliant with her medication.s   Reports no new issues. Compliant with medications. Denies side effects.  Chart reviewed, pharmacy updated, prior notes, labs, imaging, EKGs reviewed.    The patient denies headaches, lightheadedness, changes in speech/vision, photophobia, dysphagia, diaphoresis, Chest pain, dyspnea, Abdominal pain, recent falls or trauma, and changes in bowel/urinary habits.          Past Medical History   Past Medical History:   Diagnosis Date    Body mass index (BMI) 36.0-36.9, adult 2022    BMI 36.0-36.9,adult    Body mass index (BMI) 37.0-37.9, adult 10/06/2022    BMI 37.0-37.9, adult    Body mass index (BMI)40.0-44.9, adult 10/06/2022    Body mass index (BMI) of 40.0 to 44.9 in adult    Hyperlipidemia, unspecified     Borderline hyperlipidemia    Morbid (severe) obesity due to excess calories (Multi) 2022    Obesity, Class III, BMI 40-49.9 (morbid obesity)    Personal history of other specified conditions 2022    History of postoperative nausea and vomiting    Polyphagia 2020    Polyphagia      Past Surgical History:   Past Surgical History:   Procedure Laterality Date    OTHER SURGICAL HISTORY  2022    Intrauterine device removal    OTHER SURGICAL HISTORY  2022    Salpingo-oophorectomy    OTHER SURGICAL HISTORY  2022     section    OTHER SURGICAL HISTORY  2022    Hysterectomy    OTHER SURGICAL HISTORY  2022    Sleeve gastrectomy     Family History:   Family History   Problem Relation Name Age of Onset    [place] disease  Mother      Diabetes Paternal Grandmother      Breast cancer Paternal Grandmother  69    Hypertension Paternal Grandfather       Social History:   Tobacco Use: Medium Risk (3/3/2025)    Patient History     Smoking Tobacco Use: Former     Smokeless Tobacco Use: Never     Passive Exposure: Past      Social History     Substance and Sexual Activity   Alcohol Use Never      Allergies:   No Known Allergies   ROS   The patient denies headaches, lightheadedness, changes in speech/vision, photophobia, dysphagia, diaphoresis, Chest pain, dyspnea, Abdominal pain, recent falls or trauma, and changes in bowel/urinary habits.  Objective   Vitals:    03/03/25 0817   BP: (!) 167/104   Pulse: 54      BMI Readings from Last 15 Encounters:   03/03/25 32.54 kg/m²   12/20/24 32.69 kg/m²   09/10/24 30.26 kg/m²   08/30/24 32.39 kg/m²   07/08/24 30.26 kg/m²   03/18/24 28.89 kg/m²   03/12/24 28.74 kg/m²   11/07/23 28.01 kg/m²   09/22/23 28.28 kg/m²   08/25/23 29.19 kg/m²   06/28/23 31.02 kg/m²   03/20/23 32.39 kg/m²   03/10/23 32.99 kg/m²   02/07/23 34.55 kg/m²   12/08/22 35.05 kg/m²      97.1 kg (214 lb) (3/3/2025  8:17 AM)    Physical Exam     GE; sitting comfortably in a chair, in NAD, not acutely ill looking  HEENT; AT/NC, EOMI, no conjunctival pallor, anicteric sclera  Neck; Supple neck, no LAD/JVD  Chest; CTAbl, no adventitious sounds  CVS; s1 and s2 only no MGR, RRR  Abd; soft, NT/ND, normoactive BS  Ext; no cyanosis/clubbing/edema, pulses intact  Neuro; Aox3  Psych; normal mood   Labs:  CBC:  Recent Labs     08/30/24  1015 03/20/23  0814 12/03/22  0945   WBC 5.5 5.5 9.6   HGB 11.4* 12.3 12.0   HCT 36.2 39.6 37.9    324 354   MCV 97 97 95     CMP:  Recent Labs     08/30/24  1015 09/30/23  0832 06/28/23  0826    139 139   K 4.0 3.7 4.5    105 106   CO2 28 27 26   ANIONGAP 12 11 12   BUN 11 11 18   CREATININE 0.68 0.58 0.75   EGFR >90 >90  --    GLUCOSE 87 86 85     Recent Labs     08/30/24  1015 09/30/23  0832  "03/20/23  0814   ALBUMIN 4.1 4.1 4.1   ALKPHOS 52 45 48   ALT 12 10 14   AST 16 14 23   BILITOT 1.0 0.7 0.7     Calcium/Phos:   Lab Results   Component Value Date    CALCIUM 9.5 08/30/2024      COAG:   Recent Labs     05/28/22  1049 12/18/20  1031   INR 1.1 1.1     CRP:   Lab Results   Component Value Date    CRP 0.41 05/28/2022      [unfilled]   ENDO:  Recent Labs     08/30/24  1015 09/30/23  0832 03/20/23  0814 12/03/22  0945 05/28/22  1049 01/03/22  1047   TSH 1.56 1.26  --   --  2.02 1.70   HGBA1C 4.1  --  4.3 4.4 4.8 4.7      CARDIAC: No results for input(s): \"LDH\", \"CKMB\", \"TROPHS\", \"BNP\" in the last 20879 hours.    No lab exists for component: \"CK\", \"CKMBP\"  Recent Labs     08/30/24  1015 09/30/23  0832 05/28/22  1049 01/03/22  1047   CHOL 209* 209* 178 248*   LDLF  --   --  110* 168*   LDLCALC 127* 138*  --   --    HDL 73.2 62.3 54.4 63.9   TRIG 42 46 67 81     No data recorded  Current Medications:  Current Outpatient Medications   Medication Sig Dispense Refill    amLODIPine (Norvasc) 10 mg tablet Take 1 tablet (10 mg) by mouth once daily. 30 tablet 5    calcium citrate (Calcitrate) 200 mg (950 mg) tablet Take 1 tablet (200 mg) by mouth 3 times a day.      cholecalciferol (Vitamin D3) 5,000 Units tablet Take 1 tablet (5,000 Units) by mouth once daily. 90 tablet 1    lisinopriL-hydrochlorothiazide 20-25 mg tablet Take 1 tablet by mouth once daily. 100 tablet 3    mv-min/iron/folic/calcium/vitK (WOMEN'S MULTIVITAMIN ORAL) Take 1 tablet by mouth 1 (one) time each day.      propranolol (Inderal) 40 mg tablet Take 1 tablet (40 mg) by mouth 2 times a day. 180 tablet 1     No current facility-administered medications for this visit.       Assessment and Plan  Cindy was seen today for follow-up.  Diagnoses and all orders for this visit:  Primary hypertension (Primary)  -     Comprehensive metabolic panel; Future  -     Lipid panel; Future  -     CBC; Future  -     Albumin-Creatinine Ratio, Urine Random; " Future  -     lisinopriL-hydrochlorothiazide 20-25 mg tablet; Take 1 tablet by mouth once daily.  -     Comprehensive metabolic panel  -     Lipid panel  -     CBC  -     Albumin-Creatinine Ratio, Urine Random  Hyperlipidemia, unspecified hyperlipidemia type  -     Lipid panel; Future  -     Lipid panel  Bariatric surgery status  Vitamin D deficiency  Overweight (BMI 25.0-29.9)  Class 1 obesity due to excess calories without serious comorbidity with body mass index (BMI) of 31.0 to 31.9 in adult       The patient has a med hx of HTN, Vit d def, migraines MARIA LUISA, sp bariatric surgery who presents today for follow of BP.     HTN  Blood pressure measured today was   continue the following med(s): amlodipine, propranolol, we increased lisinopril from 10 to 20 and hydrochlorothiazide from 12.5 to 25  Asked her to keep log of bp for the next two week, if pressures are still elevated, we will make a dx of resistant HTN and start further workup for secondary causes of HTN.   The patient was advised on eating a heart healthy diet, with less salt,  Regular physical activity including exercising at lest 90 mins 4days a week  Maintaining a healthy weight and weight loss measures including intermittent -fasting and reduction in food portions  Limiting intake of alcohol, and use of tobacco  Getting adequate amount of sleep, including practicing good sleep hygiene.       Vit d def,   Continue supplement    migraines MARIA LUISA, sp bariatric surgery  Continue diet and exercise regimen    HLD/ Obesity  Continue diet and exercise regimen  Will not start statin at the moment  Ordered repeat lipids.     Ordered routine labs today cbc, cmp, lipid, tsh      Immunizations:  Immunization History   Administered Date(s) Administered    Influenza, seasonal, injectable 10/18/2022    Pfizer Purple Cap SARS-CoV-2 06/26/2021, 07/17/2021, 01/22/2022    Tdap vaccine, age 7 year and older (BOOSTRIX, ADACEL) 06/01/2010       Mario Alberto Hernandes MD   Internal  Medicine   PGY 3

## 2025-03-04 LAB
ALBUMIN SERPL-MCNC: 4.4 G/DL (ref 3.6–5.1)
ALBUMIN/CREAT UR: ABNORMAL MG/G CREAT
ALP SERPL-CCNC: 56 U/L (ref 31–125)
ALT SERPL-CCNC: 10 U/L (ref 6–29)
ANION GAP SERPL CALCULATED.4IONS-SCNC: 9 MMOL/L (CALC) (ref 7–17)
AST SERPL-CCNC: 18 U/L (ref 10–30)
BILIRUB SERPL-MCNC: 0.8 MG/DL (ref 0.2–1.2)
BUN SERPL-MCNC: 12 MG/DL (ref 7–25)
CALCIUM SERPL-MCNC: 9.5 MG/DL (ref 8.6–10.2)
CHLORIDE SERPL-SCNC: 102 MMOL/L (ref 98–110)
CHOLEST SERPL-MCNC: 232 MG/DL
CHOLEST/HDLC SERPL: 2.8 (CALC)
CO2 SERPL-SCNC: 26 MMOL/L (ref 20–32)
CREAT SERPL-MCNC: 0.75 MG/DL (ref 0.5–0.99)
CREAT UR-MCNC: 17 MG/DL (ref 20–275)
EGFRCR SERPLBLD CKD-EPI 2021: 102 ML/MIN/1.73M2
ERYTHROCYTE [DISTWIDTH] IN BLOOD BY AUTOMATED COUNT: 11.6 % (ref 11–15)
GLUCOSE SERPL-MCNC: 89 MG/DL (ref 65–99)
HCT VFR BLD AUTO: 39.6 % (ref 35–45)
HDLC SERPL-MCNC: 83 MG/DL
HGB BLD-MCNC: 12.8 G/DL (ref 11.7–15.5)
LDLC SERPL CALC-MCNC: 137 MG/DL (CALC)
MCH RBC QN AUTO: 31.7 PG (ref 27–33)
MCHC RBC AUTO-ENTMCNC: 32.3 G/DL (ref 32–36)
MCV RBC AUTO: 98 FL (ref 80–100)
MICROALBUMIN UR-MCNC: <0.2 MG/DL
NONHDLC SERPL-MCNC: 149 MG/DL (CALC)
PLATELET # BLD AUTO: 365 THOUSAND/UL (ref 140–400)
PMV BLD REES-ECKER: 11.1 FL (ref 7.5–12.5)
POTASSIUM SERPL-SCNC: 4.4 MMOL/L (ref 3.5–5.3)
PROT SERPL-MCNC: 7.6 G/DL (ref 6.1–8.1)
RBC # BLD AUTO: 4.04 MILLION/UL (ref 3.8–5.1)
SODIUM SERPL-SCNC: 137 MMOL/L (ref 135–146)
TRIGL SERPL-MCNC: 40 MG/DL
WBC # BLD AUTO: 7 THOUSAND/UL (ref 3.8–10.8)

## 2025-03-04 NOTE — RESULT ENCOUNTER NOTE
Your cholesterol is slightly elevated.  Please watch your diet closer, eat more vegetables, cut back on fried foods, whole dairy, nixon, salad dressings, red meat, processed foods and refined carbs, such as sugary drinks, pastries, cookies, cakes, ice cream, white rice, potatoes and pasta.  By optimizing your health through good nutrition, daily exercise, stress management, low/moderate alcohol and avoidance of tobacco, sugar and processed foods, you can help to decrease your risk of cardiovascular disease and stroke and achieve a healthy weight. A diet rich in whole, plant-based foods such as vegetables, beans, seeds, nuts, whole grains, healthy fats and fruit - leads to lower body mass index, blood pressure, HbA1C, and cholesterol levels.

## 2025-03-28 DIAGNOSIS — E55.9 HYPOVITAMINOSIS D: ICD-10-CM

## 2025-03-31 RX ORDER — ACETAMINOPHEN 500 MG
5000 TABLET ORAL DAILY
Qty: 90 TABLET | Refills: 0 | Status: SHIPPED | OUTPATIENT
Start: 2025-03-31

## 2025-09-09 ENCOUNTER — APPOINTMENT (OUTPATIENT)
Dept: SURGERY | Facility: CLINIC | Age: 40
End: 2025-09-09
Payer: COMMERCIAL